# Patient Record
Sex: FEMALE | Race: WHITE | NOT HISPANIC OR LATINO | Employment: OTHER | URBAN - METROPOLITAN AREA
[De-identification: names, ages, dates, MRNs, and addresses within clinical notes are randomized per-mention and may not be internally consistent; named-entity substitution may affect disease eponyms.]

---

## 2023-05-23 LAB
ANION GAP IN SER/PLAS: 13 MMOL/L (ref 10–20)
CALCIUM (MG/DL) IN SER/PLAS: 9.5 MG/DL (ref 8.6–10.6)
CARBON DIOXIDE, TOTAL (MMOL/L) IN SER/PLAS: 26 MMOL/L (ref 21–32)
CHLORIDE (MMOL/L) IN SER/PLAS: 109 MMOL/L (ref 98–107)
CREATININE (MG/DL) IN SER/PLAS: 1.65 MG/DL (ref 0.5–1.05)
GFR FEMALE: 31 ML/MIN/1.73M2
GLUCOSE (MG/DL) IN SER/PLAS: 78 MG/DL (ref 74–99)
POTASSIUM (MMOL/L) IN SER/PLAS: 4.5 MMOL/L (ref 3.5–5.3)
SODIUM (MMOL/L) IN SER/PLAS: 143 MMOL/L (ref 136–145)
UREA NITROGEN (MG/DL) IN SER/PLAS: 36 MG/DL (ref 6–23)

## 2023-11-28 ENCOUNTER — OFFICE VISIT (OUTPATIENT)
Dept: OBSTETRICS AND GYNECOLOGY | Facility: CLINIC | Age: 83
End: 2023-11-28
Payer: MEDICARE

## 2023-11-28 VITALS
DIASTOLIC BLOOD PRESSURE: 65 MMHG | SYSTOLIC BLOOD PRESSURE: 110 MMHG | HEART RATE: 89 BPM | BODY MASS INDEX: 23.36 KG/M2 | WEIGHT: 129.8 LBS

## 2023-11-28 DIAGNOSIS — Z46.89 PESSARY MAINTENANCE: ICD-10-CM

## 2023-11-28 DIAGNOSIS — N81.4 UTEROVAGINAL PROLAPSE: Primary | ICD-10-CM

## 2023-11-28 PROCEDURE — 99213 OFFICE O/P EST LOW 20 MIN: CPT | Mod: PO | Performed by: NURSE PRACTITIONER

## 2023-11-28 PROCEDURE — 99213 OFFICE O/P EST LOW 20 MIN: CPT | Performed by: NURSE PRACTITIONER

## 2023-11-28 PROCEDURE — 1126F AMNT PAIN NOTED NONE PRSNT: CPT | Performed by: NURSE PRACTITIONER

## 2023-11-28 NOTE — PROGRESS NOTES
Procedures    Procedure Orders   Pessary [[id number]] ordered by SIS Montes          Pessary     Date/Time: 11/28/2023 9:53 AM     Performed by: SIS Montes  Authorized by: SIS Montes    Consent:     Consent given by:  Patient  Indication:     Indication for pessary: uterine prolapse    Procedure:     Pessary type:  Ring w/ support    Pessary size:  3         Additional Comments:  - Denies the use of Estrogen cream due to insurance not covering it   - Wears panty liners   - Normal healthy vaginal tissue   - Denies itching, burning, or overall complaints  Pessary removed, washed and replaced.     RTC in 3 months     SIS Montes

## 2024-01-22 ENCOUNTER — TELEPHONE (OUTPATIENT)
Dept: OBSTETRICS AND GYNECOLOGY | Facility: CLINIC | Age: 84
End: 2024-01-22

## 2024-01-22 PROBLEM — N18.30 CHRONIC KIDNEY DISEASE, STAGE 3 (MULTI): Status: ACTIVE | Noted: 2024-01-22

## 2024-01-22 PROBLEM — I25.10 CAD (CORONARY ARTERY DISEASE): Status: ACTIVE | Noted: 2024-01-22

## 2024-01-22 PROBLEM — I10 HYPERTENSION: Status: ACTIVE | Noted: 2024-01-22

## 2024-01-23 ENCOUNTER — APPOINTMENT (OUTPATIENT)
Dept: VASCULAR MEDICINE | Facility: HOSPITAL | Age: 84
End: 2024-01-23
Payer: MEDICARE

## 2024-01-23 ENCOUNTER — OFFICE VISIT (OUTPATIENT)
Dept: CARDIOLOGY | Facility: HOSPITAL | Age: 84
End: 2024-01-23
Payer: MEDICARE

## 2024-01-23 VITALS
HEIGHT: 63 IN | WEIGHT: 129 LBS | SYSTOLIC BLOOD PRESSURE: 126 MMHG | OXYGEN SATURATION: 98 % | BODY MASS INDEX: 22.86 KG/M2 | DIASTOLIC BLOOD PRESSURE: 52 MMHG | HEART RATE: 75 BPM

## 2024-01-23 DIAGNOSIS — E78.2 MIXED HYPERLIPIDEMIA: Primary | ICD-10-CM

## 2024-01-23 DIAGNOSIS — I25.10 CORONARY ARTERY DISEASE INVOLVING NATIVE CORONARY ARTERY OF NATIVE HEART WITHOUT ANGINA PECTORIS: ICD-10-CM

## 2024-01-23 DIAGNOSIS — I10 PRIMARY HYPERTENSION: ICD-10-CM

## 2024-01-23 PROBLEM — N81.4 CYSTOCELE WITH UTERINE PROLAPSE: Status: ACTIVE | Noted: 2024-01-23

## 2024-01-23 PROBLEM — S09.90XS DIZZINESS DUE TO OLD HEAD TRAUMA: Status: ACTIVE | Noted: 2024-01-23

## 2024-01-23 PROBLEM — R42 DIZZINESS DUE TO OLD HEAD TRAUMA: Status: ACTIVE | Noted: 2024-01-23

## 2024-01-23 PROBLEM — M25.512 LEFT SHOULDER PAIN: Status: ACTIVE | Noted: 2024-01-23

## 2024-01-23 PROBLEM — H26.493 BILATERAL POSTERIOR CAPSULAR OPACIFICATION: Status: ACTIVE | Noted: 2023-09-07

## 2024-01-23 PROBLEM — N95.2 ATROPHY OF VAGINA: Status: ACTIVE | Noted: 2024-01-23

## 2024-01-23 PROBLEM — K21.9 GERD (GASTROESOPHAGEAL REFLUX DISEASE): Status: ACTIVE | Noted: 2024-01-23

## 2024-01-23 PROBLEM — R42 VERTIGO: Status: ACTIVE | Noted: 2024-01-23

## 2024-01-23 PROBLEM — N28.9 ABNORMAL RENAL FUNCTION: Status: ACTIVE | Noted: 2024-01-23
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
## 2024-01-23 PROBLEM — H69.93 DYSFUNCTION OF BOTH EUSTACHIAN TUBES: Status: ACTIVE | Noted: 2024-01-23

## 2024-01-23 PROBLEM — K80.20 GALLSTONES: Status: ACTIVE | Noted: 2024-01-23

## 2024-01-23 PROBLEM — R31.9 HEMATURIA: Status: ACTIVE | Noted: 2024-01-23

## 2024-01-23 PROBLEM — H93.13 BILATERAL TINNITUS: Status: ACTIVE | Noted: 2024-01-23

## 2024-01-23 PROBLEM — N39.0 ACUTE LOWER UTI: Status: ACTIVE | Noted: 2024-01-23

## 2024-01-23 PROBLEM — S09.90XA HEAD TRAUMA: Status: ACTIVE | Noted: 2024-01-23

## 2024-01-23 PROBLEM — R63.5 ABNORMAL WEIGHT GAIN: Status: ACTIVE | Noted: 2024-01-23

## 2024-01-23 PROBLEM — R30.0 DYSURIA: Status: ACTIVE | Noted: 2024-01-23

## 2024-01-23 PROBLEM — Z96.1 PSEUDOPHAKIA OF BOTH EYES: Status: ACTIVE | Noted: 2023-08-16

## 2024-01-23 PROBLEM — H90.3 BILATERAL SENSORINEURAL HEARING LOSS: Status: ACTIVE | Noted: 2024-01-23

## 2024-01-23 PROBLEM — H35.3130 BILATERAL DRY AGE-RELATED MACULAR DEGENERATION: Status: ACTIVE | Noted: 2023-08-16

## 2024-01-23 PROBLEM — R09.89 DIMINISHED PULSES IN LOWER EXTREMITY: Status: ACTIVE | Noted: 2024-01-23

## 2024-01-23 PROBLEM — M25.511 RIGHT SHOULDER PAIN: Status: ACTIVE | Noted: 2024-01-23

## 2024-01-23 PROBLEM — H00.15 CHALAZION OF LEFT LOWER EYELID: Status: ACTIVE | Noted: 2023-09-07

## 2024-01-23 PROBLEM — H52.7 REFRACTION ERROR: Status: ACTIVE | Noted: 2023-09-07

## 2024-01-23 PROBLEM — M25.619 STIFFNESS OF SHOULDER JOINT: Status: ACTIVE | Noted: 2024-01-23

## 2024-01-23 PROCEDURE — 3078F DIAST BP <80 MM HG: CPT | Performed by: NURSE PRACTITIONER

## 2024-01-23 PROCEDURE — 1159F MED LIST DOCD IN RCRD: CPT | Performed by: NURSE PRACTITIONER

## 2024-01-23 PROCEDURE — 93005 ELECTROCARDIOGRAM TRACING: CPT | Performed by: NURSE PRACTITIONER

## 2024-01-23 PROCEDURE — 93010 ELECTROCARDIOGRAM REPORT: CPT | Performed by: INTERNAL MEDICINE

## 2024-01-23 PROCEDURE — 99214 OFFICE O/P EST MOD 30 MIN: CPT | Performed by: NURSE PRACTITIONER

## 2024-01-23 PROCEDURE — 1157F ADVNC CARE PLAN IN RCRD: CPT | Performed by: NURSE PRACTITIONER

## 2024-01-23 PROCEDURE — 1036F TOBACCO NON-USER: CPT | Performed by: NURSE PRACTITIONER

## 2024-01-23 PROCEDURE — 3074F SYST BP LT 130 MM HG: CPT | Performed by: NURSE PRACTITIONER

## 2024-01-23 PROCEDURE — 1126F AMNT PAIN NOTED NONE PRSNT: CPT | Performed by: NURSE PRACTITIONER

## 2024-01-23 RX ORDER — LISINOPRIL 20 MG/1
20 TABLET ORAL DAILY
COMMUNITY
End: 2024-03-19 | Stop reason: ALTCHOICE

## 2024-01-23 RX ORDER — ATORVASTATIN CALCIUM 80 MG/1
80 TABLET, FILM COATED ORAL NIGHTLY
COMMUNITY
End: 2024-04-10

## 2024-01-23 RX ORDER — MULTIVIT-MIN/FA/LYCOPEN/LUTEIN .4-300-25
1 TABLET ORAL DAILY
COMMUNITY
Start: 2022-04-05

## 2024-01-23 RX ORDER — POLYETHYLENE GLYCOL 3350 17 G/17G
17 POWDER, FOR SOLUTION ORAL AS NEEDED
COMMUNITY
Start: 2022-04-05

## 2024-01-23 RX ORDER — ASPIRIN 81 MG/1
1 TABLET ORAL DAILY
COMMUNITY
Start: 2014-10-15

## 2024-01-23 NOTE — PATIENT INSTRUCTIONS
Continue current cardiovascular medications  Follow up in 6 months with lower extremity vascular testing.

## 2024-01-23 NOTE — PROGRESS NOTES
Primary Care Physician: Maria Ines Travis DO  Date of Visit: 01/23/2024 10:00 AM EST  Location of visit: [facility]     Chief Complaint:   Chief Complaint   Patient presents with    Follow-up    Coronary Artery Disease    Hyperlipidemia    Hypertension        HPI / Summary:   Yin Harris is a 83 y.o. female presents for followup. Seen in collaboration with Dr. Mata. She has no particular complaints. She is able to do daily activity without chest pain or dyspnea. Her prior orthostatic lightheadedness resolved. The patient denies chest pain, shortness of breath, palpitations,  lightheadedness,syncope, orthopnea, paroxysmal nocturnal dyspnea, lower extremity edema, or bleeding problems.             Past Medical History:  Past Medical History:   Diagnosis Date    Atherosclerotic heart disease of native coronary artery without angina pectoris 07/12/2022    CAD (coronary artery disease)    Essential (primary) hypertension 07/12/2022    Hypertension    Hyperlipidemia, unspecified 07/12/2022    Hyperlipidemia    Personal history of other diseases of urinary system 04/05/2022    History of chronic kidney disease        Past Surgical History:  Past Surgical History:   Procedure Laterality Date    MR HEAD ANGIO WO IV CONTRAST  7/11/2016    MR HEAD ANGIO WO IV CONTRAST 7/11/2016 [facility] EMERGENCY LEGACY    MR NECK ANGIO WO IV CONTRAST  7/11/2016    MR NECK ANGIO WO IV CONTRAST 7/11/2016 [facility] EMERGENCY LEGACY    OTHER SURGICAL HISTORY  11/18/2014    Previous Stent Placement    OTHER SURGICAL HISTORY  03/04/2022    Cataract surgery    OTHER SURGICAL HISTORY  01/20/2022    Cholecystectomy    TONSILLECTOMY  03/04/2022    Tonsillectomy With Adenoidectomy          Social History:   reports that she quit smoking about 10 years ago. Her smoking use included cigarettes. She has never used smokeless tobacco.     Family History:  family history is not on file.      Allergies:  No Known Allergies    Outpatient Medications:  Current  "Outpatient Medications   Medication Instructions    aspirin 81 mg EC tablet 1 tablet, oral, Daily    atorvastatin (LIPITOR) 80 mg, oral, Nightly    lisinopril 20 mg, oral, Daily    multivitamin with minerals iron-free (Centrum Silver) 1 tablet, oral, Daily    polyethylene glycol (MIRALAX) 17 g, oral, As needed       Physical Exam:  Vitals:    01/23/24 0953   BP: 126/52   BP Location: Left arm   Patient Position: Sitting   BP Cuff Size: Adult   Pulse: 75   SpO2: 98%   Weight: 58.5 kg (129 lb)   Height: 1.588 m (5' 2.5\")     Wt Readings from Last 5 Encounters:   01/23/24 58.5 kg (129 lb)   11/28/23 58.9 kg (129 lb 12.8 oz)   08/29/23 60.3 kg (133 lb)   07/18/23 60.8 kg (134 lb)   05/09/23 60.3 kg (133 lb)     Body mass index is 23.22 kg/m².     GENERAL: alert, cooperative, pleasant, in no acute distress  SKIN: warm and dry  NECK: Normal JVD, negative HJR  CARDIAC: Regular rate and rhythm with no rubs, murmurs, or gallops  CHEST: Normal respiratory efforts, lungs clear to auscultation bilaterally.  ABDOMEN: soft, nontender, nondistended  EXTREMITIES: no edema, +2 palpable RP bilaterally and +1 right PT pulse and faint left DP pulse. Unable to palpate left PT pulse       Last Labs:  Recent Labs     01/19/23  1113 03/04/22  1103 01/05/22  1139   WBC 5.3 5.1 6.4   HGB 11.7* 11.0* 11.1*   HCT 38.2 36.3 35.5*    228 197   MCV 96 96 95     Recent Labs     05/23/23  0709 01/19/23  1113 07/12/22  1115    142 141   K 4.5 4.2 3.9   * 107 107   BUN 36* 35* 25*   CREATININE 1.65* 1.78* 1.49*     CMP -  Lab Results   Component Value Date    CALCIUM 9.5 05/23/2023    PROT 6.5 01/19/2023    ALBUMIN 3.8 01/19/2023    AST 26 01/19/2023    ALT 21 01/19/2023    ALKPHOS 70 01/19/2023    BILITOT 0.5 01/19/2023       LIPID PANEL -   Lab Results   Component Value Date    CHOL 130 01/19/2023    HDL 56.1 01/19/2023    LDLF 59 01/19/2023    TRIG 77 01/19/2023       No results found for: \"BNP\", \"HGBA1C\"    Last Cardiology " Tests:  ECG:  Obtained and reviewed EKG- normal sinus rhythm HR 66               Assessment/Plan   Yin was seen today for follow-up, coronary artery disease, hyperlipidemia and hypertension.  Diagnoses and all orders for this visit:  Mixed hyperlipidemia (Primary)  -     ECG 12 lead (Clinic Performed)  Coronary artery disease involving native coronary artery of native heart without angina pectoris  Primary hypertension      Ms. Harris is a pleasant 83 year old white female with a past medical history significant for coronary artery disease, status post posterior lateral ST elevation myocardial infarction and drug-eluting stent placement in the obtuse marginal branch with residual disease in her RCA and preserved left ventricular systolic function, hypertension, hyperlipidemia, chronic kidney disease, and prior chronic tobacco use. She is asymptomatic from a cardiac perspective. Her blood pressure is controlled. Recent lipid panel is at goal. She will have lower extremity vascular testing at next appointment as her left dorsal pedal pulse is weak. She had canceled prior appointment for testing. She should continue her current cardiovascular medications. She will follow up in 6 months.       Orders:  Orders Placed This Encounter   Procedures    ECG 12 lead (Clinic Performed)      Followup Appts:  Future Appointments   Date Time Provider Department Center   2/22/2024 10:15 AM SIS Montes [id number]   7/16/2024 10:30 AM [facility] 2 AHUVSC U [facility]   7/16/2024 11:30 AM SIS Helms [id number] [facility]   1/21/2025  9:00 AM Anil Mata MD AHUCR1 [facility]           ____________________________________________________________  SIS Helms  [facility]  [facility]

## 2024-02-18 LAB
ATRIAL RATE: 66 BPM
P AXIS: 58 DEGREES
P OFFSET: 194 MS
P ONSET: 146 MS
PR INTERVAL: 150 MS
Q ONSET: 221 MS
QRS COUNT: 11 BEATS
QRS DURATION: 84 MS
QT INTERVAL: 398 MS
QTC CALCULATION(BAZETT): 417 MS
QTC FREDERICIA: 411 MS
R AXIS: 80 DEGREES
T AXIS: 57 DEGREES
T OFFSET: 420 MS
VENTRICULAR RATE: 66 BPM

## 2024-02-22 ENCOUNTER — OFFICE VISIT (OUTPATIENT)
Dept: OBSTETRICS AND GYNECOLOGY | Facility: CLINIC | Age: 84
End: 2024-02-22
Payer: MEDICARE

## 2024-02-22 VITALS
HEIGHT: 63 IN | DIASTOLIC BLOOD PRESSURE: 52 MMHG | SYSTOLIC BLOOD PRESSURE: 89 MMHG | HEART RATE: 80 BPM | WEIGHT: 128 LBS | BODY MASS INDEX: 22.68 KG/M2

## 2024-02-22 DIAGNOSIS — N81.4 UTEROVAGINAL PROLAPSE: Primary | ICD-10-CM

## 2024-02-22 DIAGNOSIS — Z46.89 PESSARY MAINTENANCE: ICD-10-CM

## 2024-02-22 PROCEDURE — 1126F AMNT PAIN NOTED NONE PRSNT: CPT | Performed by: NURSE PRACTITIONER

## 2024-02-22 PROCEDURE — 3078F DIAST BP <80 MM HG: CPT | Performed by: NURSE PRACTITIONER

## 2024-02-22 PROCEDURE — 99213 OFFICE O/P EST LOW 20 MIN: CPT | Performed by: NURSE PRACTITIONER

## 2024-02-22 PROCEDURE — 3074F SYST BP LT 130 MM HG: CPT | Performed by: NURSE PRACTITIONER

## 2024-02-22 PROCEDURE — 1157F ADVNC CARE PLAN IN RCRD: CPT | Performed by: NURSE PRACTITIONER

## 2024-02-22 PROCEDURE — 1159F MED LIST DOCD IN RCRD: CPT | Performed by: NURSE PRACTITIONER

## 2024-02-22 PROCEDURE — 1036F TOBACCO NON-USER: CPT | Performed by: NURSE PRACTITIONER

## 2024-02-22 ASSESSMENT — ENCOUNTER SYMPTOMS
PSYCHIATRIC NEGATIVE: 1
NEUROLOGICAL NEGATIVE: 1
GASTROINTESTINAL NEGATIVE: 1
CONSTITUTIONAL NEGATIVE: 1
RESPIRATORY NEGATIVE: 1

## 2024-02-22 ASSESSMENT — PAIN SCALES - GENERAL: PAINLEVEL: 0-NO PAIN

## 2024-02-22 NOTE — PROGRESS NOTES
"Urogynecology Pessary Check Visit  Jaye Cao, APRN-CNP  [phone]      History of Present Illness:    SYDNEE Esqueda presents today with a history of low blood pressure and syncope, as well as a past incident of a broken shoulder. She reports that her typical blood pressure readings range from 110s to 120s systolic and 50s diastolic. Currently, Yin is under the medical management of Dr. Mcfarlane, with Dr. Travis serving as her primary care physician and Dr. Lynn overseeing her cardiac health. Her treatment regimen includes the administration of lisinopril for the management of her blood pressure.    Ms. Yin Harris  presents for pessary check .    1/22/2024   Pessary type: #3 ring with support   Bleeding?: denies   Discomfort?: denies   Bowel or bladder symptoms: none   Vaginal estrogen: yes, normal     Past medical, surgical, social, family, allergy, and medication histories were reviewed and updated in EPIC charting.       Review of Systems   Constitutional: Negative.    HENT: Negative.     Respiratory: Negative.     Gastrointestinal: Negative.    Genitourinary: Negative.    Neurological: Negative.    Psychiatric/Behavioral: Negative.           Objective    BP 89/52   Pulse 80   Ht 1.588 m (5' 2.5\")   Wt 58.1 kg (128 lb)   BMI 23.04 kg/m²    Body mass index is 23.04 kg/m².   Relevant medical history: History of low blood pressure, syncope, and a broken shoulder.  Current medications: Lisinopril.    Physical Exam:  Physical Exam  Constitutional:       Appearance: Normal appearance.   HENT:      Head: Normocephalic.   Eyes:      Extraocular Movements: Extraocular movements intact.      Conjunctiva/sclera: Conjunctivae normal.      Pupils: Pupils are equal, round, and reactive to light.   Cardiovascular:      Rate and Rhythm: Normal rate and regular rhythm.      Pulses: Normal pulses.      Heart sounds: Normal heart sounds.   Pulmonary:      Effort: Pulmonary effort is normal.      Breath sounds: " Normal breath sounds.   Abdominal:      General: Abdomen is flat. Bowel sounds are normal.      Palpations: Abdomen is soft.   Musculoskeletal:         General: Normal range of motion.      Cervical back: Normal range of motion.   Neurological:      General: No focal deficit present.      Mental Status: She is alert and oriented to person, place, and time.   Psychiatric:         Mood and Affect: Mood normal.         Behavior: Behavior normal.         Thought Content: Thought content normal.         Judgment: Judgment normal.             Pelvic exam:   Speculum examination: The vagina and cervix were inspected and were free  of erosions. No blood in vaginal vault. Normal discharge appreciated.    Bimanual exam: The uterus was unremarkable.  There were no masses or tenderness in the pelvis/adnexal region.   The pessary was removed, cleaned and replaced without complication     Assessment & Plan:    83 y.o. yo woman with uterine prolapse, managed with a pessary.    No problem-specific Assessment & Plan notes found for this encounter.  Comorbidities include: CAD, Mixed HLD, HTN, Stage III CKD, GERD    Plan:    1. Management of Hypotension     - Patient presents with a history of low blood pressure, currently documented at 89/15 mmHg. Usual systolic readings range from 110s-120s, with diastolic readings in the 50s.     - Plan of care includes:       a. Ongoing monitoring of blood pressure.       b. Continuation of lisinopril as directed by Dr. Travis.       c. Evaluation and management consultation with cardiologist Dr. Lynn.     2. Prolapse and Pessary management  -Satisfactory management with pessary with no erosions . Continue current care.    - no use of estrogen cream    - Return to clinic in 3 months        SIS Montes   By signing my name below, Samia DAVIS scribe attest that this documentation has been prepared under the direction and in the presence of SIS Montes,  Jaye Cao, FAWAD-MIRIAM, personally performed the services described in this documentation which was scribed virtually and I confirm that it is both accurate and complete.

## 2024-02-27 ENCOUNTER — APPOINTMENT (OUTPATIENT)
Dept: OBSTETRICS AND GYNECOLOGY | Facility: CLINIC | Age: 84
End: 2024-02-27
Payer: MEDICARE

## 2024-03-07 ENCOUNTER — TELEPHONE (OUTPATIENT)
Dept: CARDIOLOGY | Facility: HOSPITAL | Age: 84
End: 2024-03-07
Payer: MEDICARE

## 2024-03-07 NOTE — TELEPHONE ENCOUNTER
Patient reports syncopal event with LOC when getting out of bed last night. She denies injury. She reports her Bps have been in the 70-80s systaltically and she has been feeling severely dizzy with position changes.    She does not have family or transportation today to take her to urgent care or ED.  EMS advised, multiple times. She refused. Education provided on the risks of hypotension. She verbalized understanding but refused, stated she would drive to urgent care sometime. Advised her against driving.     Medication list reconciled. Instructed her to hold lisinopril until instructed further by Dr. Mata or RITCHIE Lynn. Encouraged hydration and slow position changes.

## 2024-03-14 ENCOUNTER — OFFICE VISIT (OUTPATIENT)
Dept: CARDIOLOGY | Facility: HOSPITAL | Age: 84
End: 2024-03-14
Payer: MEDICARE

## 2024-03-14 ENCOUNTER — LAB (OUTPATIENT)
Dept: LAB | Facility: LAB | Age: 84
End: 2024-03-14
Payer: MEDICARE

## 2024-03-14 VITALS
DIASTOLIC BLOOD PRESSURE: 63 MMHG | WEIGHT: 124 LBS | OXYGEN SATURATION: 95 % | SYSTOLIC BLOOD PRESSURE: 98 MMHG | BODY MASS INDEX: 22.82 KG/M2 | HEART RATE: 68 BPM | HEIGHT: 62 IN

## 2024-03-14 DIAGNOSIS — I10 PRIMARY HYPERTENSION: ICD-10-CM

## 2024-03-14 DIAGNOSIS — E78.2 MIXED HYPERLIPIDEMIA: ICD-10-CM

## 2024-03-14 DIAGNOSIS — R55 SYNCOPE AND COLLAPSE: Primary | ICD-10-CM

## 2024-03-14 DIAGNOSIS — R55 SYNCOPE AND COLLAPSE: ICD-10-CM

## 2024-03-14 LAB
ALBUMIN SERPL BCP-MCNC: 4 G/DL (ref 3.4–5)
ALP SERPL-CCNC: 80 U/L (ref 33–136)
ALT SERPL W P-5'-P-CCNC: 13 U/L (ref 7–45)
ANION GAP SERPL CALC-SCNC: 14 MMOL/L (ref 10–20)
AST SERPL W P-5'-P-CCNC: 19 U/L (ref 9–39)
ATRIAL RATE: 70 BPM
BILIRUB SERPL-MCNC: 0.6 MG/DL (ref 0–1.2)
BUN SERPL-MCNC: 43 MG/DL (ref 6–23)
CALCIUM SERPL-MCNC: 9.5 MG/DL (ref 8.6–10.3)
CHLORIDE SERPL-SCNC: 109 MMOL/L (ref 98–107)
CHOLEST SERPL-MCNC: 122 MG/DL (ref 0–199)
CHOLESTEROL/HDL RATIO: 2.7
CO2 SERPL-SCNC: 22 MMOL/L (ref 21–32)
CREAT SERPL-MCNC: 1.85 MG/DL (ref 0.5–1.05)
EGFRCR SERPLBLD CKD-EPI 2021: 27 ML/MIN/1.73M*2
ERYTHROCYTE [DISTWIDTH] IN BLOOD BY AUTOMATED COUNT: 13.1 % (ref 11.5–14.5)
GLUCOSE SERPL-MCNC: 96 MG/DL (ref 74–99)
HCT VFR BLD AUTO: 37.4 % (ref 36–46)
HDLC SERPL-MCNC: 45.9 MG/DL
HGB BLD-MCNC: 11.5 G/DL (ref 12–16)
LDLC SERPL CALC-MCNC: 48 MG/DL
MCH RBC QN AUTO: 29.9 PG (ref 26–34)
MCHC RBC AUTO-ENTMCNC: 30.7 G/DL (ref 32–36)
MCV RBC AUTO: 97 FL (ref 80–100)
NON HDL CHOLESTEROL: 76 MG/DL (ref 0–149)
NRBC BLD-RTO: 0 /100 WBCS (ref 0–0)
P AXIS: 75 DEGREES
P OFFSET: 208 MS
P ONSET: 159 MS
PLATELET # BLD AUTO: 206 X10*3/UL (ref 150–450)
POTASSIUM SERPL-SCNC: 4.6 MMOL/L (ref 3.5–5.3)
PR INTERVAL: 130 MS
PROT SERPL-MCNC: 6.7 G/DL (ref 6.4–8.2)
Q ONSET: 224 MS
QRS COUNT: 12 BEATS
QRS DURATION: 88 MS
QT INTERVAL: 386 MS
QTC CALCULATION(BAZETT): 416 MS
QTC FREDERICIA: 406 MS
R AXIS: 85 DEGREES
RBC # BLD AUTO: 3.84 X10*6/UL (ref 4–5.2)
SODIUM SERPL-SCNC: 140 MMOL/L (ref 136–145)
T AXIS: 69 DEGREES
T OFFSET: 417 MS
TRIGL SERPL-MCNC: 143 MG/DL (ref 0–149)
TSH SERPL-ACNC: 1.61 MIU/L (ref 0.44–3.98)
VENTRICULAR RATE: 70 BPM
VLDL: 29 MG/DL (ref 0–40)
WBC # BLD AUTO: 6.6 X10*3/UL (ref 4.4–11.3)

## 2024-03-14 PROCEDURE — 36415 COLL VENOUS BLD VENIPUNCTURE: CPT

## 2024-03-14 PROCEDURE — 93005 ELECTROCARDIOGRAM TRACING: CPT | Performed by: NURSE PRACTITIONER

## 2024-03-14 PROCEDURE — 85027 COMPLETE CBC AUTOMATED: CPT

## 2024-03-14 PROCEDURE — 99214 OFFICE O/P EST MOD 30 MIN: CPT | Performed by: NURSE PRACTITIONER

## 2024-03-14 PROCEDURE — 80053 COMPREHEN METABOLIC PANEL: CPT

## 2024-03-14 PROCEDURE — 3078F DIAST BP <80 MM HG: CPT | Performed by: NURSE PRACTITIONER

## 2024-03-14 PROCEDURE — 3074F SYST BP LT 130 MM HG: CPT | Performed by: NURSE PRACTITIONER

## 2024-03-14 PROCEDURE — 80061 LIPID PANEL: CPT

## 2024-03-14 PROCEDURE — 84443 ASSAY THYROID STIM HORMONE: CPT

## 2024-03-14 PROCEDURE — 1157F ADVNC CARE PLAN IN RCRD: CPT | Performed by: NURSE PRACTITIONER

## 2024-03-14 PROCEDURE — 1036F TOBACCO NON-USER: CPT | Performed by: NURSE PRACTITIONER

## 2024-03-14 PROCEDURE — 1159F MED LIST DOCD IN RCRD: CPT | Performed by: NURSE PRACTITIONER

## 2024-03-14 ASSESSMENT — ENCOUNTER SYMPTOMS
OCCASIONAL FEELINGS OF UNSTEADINESS: 1
LOSS OF SENSATION IN FEET: 0
DEPRESSION: 0

## 2024-03-14 NOTE — PATIENT INSTRUCTIONS
Increase water intake  Wear compression stockings  Sit on edge of bed for 5 minutes prior to standing up as your blood pressure is dropping upon standing up  Check blood work CBC, CMP, lipid panel, TSH  Event monitor  Do not take Lisinopril   Referral to neurologist for orthostatic hypotension

## 2024-03-14 NOTE — PROGRESS NOTES
"Primary Care Physician: Maria Ines Travis DO  Date of Visit: 03/14/2024  9:40 AM EDT  Location of visit: [facility]     Chief Complaint:   Chief Complaint   Patient presents with    Syncope    Hypotension    Dizziness        HPI / Summary:   Yin Harris is a 83 y.o. female presents for followup. Seen in collaboration with Dr. Mata. She reports an episode of syncope two weeks ago. She had stood up out of bed to go to bathroom. She felt like her knees were like \"jelly\" She recalls looking around and lowered her self to floor. She reports telling her son afterwards that she thought she passed out. This was not witnessed. She reports very brief loss of consciousness. Denies any injury. She does not recall if she was dizzy prior to episode. No loss of bowel or bladed. She does at times get dizzy/lightheaded upon standing up. She is going to stop driving. She does wear compression stockings. She denies any chest pain, shortness of breath, palpitations, orthopnea, paroxysmal nocturnal dyspnea, lower extremity edema, or bleeding problems.             Past Medical History:  Past Medical History:   Diagnosis Date    Atherosclerotic heart disease of native coronary artery without angina pectoris 07/12/2022    CAD (coronary artery disease)    Essential (primary) hypertension 07/12/2022    Hypertension    Hyperlipidemia, unspecified 07/12/2022    Hyperlipidemia    Personal history of other diseases of urinary system 04/05/2022    History of chronic kidney disease        Past Surgical History:  Past Surgical History:   Procedure Laterality Date    MR HEAD ANGIO WO IV CONTRAST  7/11/2016    MR HEAD ANGIO WO IV CONTRAST 7/11/2016 [facility] EMERGENCY LEGACY    MR NECK ANGIO WO IV CONTRAST  7/11/2016    MR NECK ANGIO WO IV CONTRAST 7/11/2016 [facility] EMERGENCY LEGACY    OTHER SURGICAL HISTORY  11/18/2014    Previous Stent Placement    OTHER SURGICAL HISTORY  03/04/2022    Cataract surgery    OTHER SURGICAL HISTORY  01/20/2022    " "Cholecystectomy    TONSILLECTOMY  03/04/2022    Tonsillectomy With Adenoidectomy          Social History:   reports that she quit smoking about 10 years ago. Her smoking use included cigarettes. She has never used smokeless tobacco.     Family History:  family history is not on file.      Allergies:  No Known Allergies    Outpatient Medications:  Current Outpatient Medications   Medication Instructions    aspirin 81 mg EC tablet 1 tablet, oral, Daily    atorvastatin (LIPITOR) 80 mg, oral, Nightly    lisinopril 20 mg, oral, Daily    multivitamin with minerals iron-free (Centrum Silver) 1 tablet, oral, Daily    polyethylene glycol (MIRALAX) 17 g, oral, As needed       Physical Exam:  Vitals:    03/14/24 0928 03/14/24 1007 03/14/24 1013   BP: 153/73 148/72 98/63   BP Location: Left arm     Pulse: 70 84 68   SpO2: 95%     Weight: 56.2 kg (124 lb)     Height: 1.575 m (5' 2\")       Wt Readings from Last 5 Encounters:   03/14/24 56.2 kg (124 lb)   02/22/24 58.1 kg (128 lb)   01/23/24 58.5 kg (129 lb)   11/28/23 58.9 kg (129 lb 12.8 oz)   08/29/23 60.3 kg (133 lb)     Body mass index is 22.68 kg/m².     GENERAL: alert, cooperative, pleasant, in no acute distress  SKIN: warm and dry  NECK: Normal JVD, negative HJR  CARDIAC: Regular rate and rhythm with no rubs, murmurs, or gallops  CHEST: Normal respiratory efforts, lungs clear to auscultation bilaterally.  ABDOMEN: soft, nontender, nondistended  EXTREMITIES: no edema, +2 palpable RP bilaterally      Last Labs:  Recent Labs     03/14/24  1110 01/19/23  1113 03/04/22  1103   WBC 6.6 5.3 5.1   HGB 11.5* 11.7* 11.0*   HCT 37.4 38.2 36.3    195 228   MCV 97 96 96     Recent Labs     03/14/24  1110 05/23/23  0709 01/19/23  1113    143 142   K 4.6 4.5 4.2   * 109* 107   BUN 43* 36* 35*   CREATININE 1.85* 1.65* 1.78*     CMP -  Lab Results   Component Value Date    CALCIUM 9.5 03/14/2024    PROT 6.7 03/14/2024    ALBUMIN 4.0 03/14/2024    AST 19 03/14/2024    ALT " "13 03/14/2024    ALKPHOS 80 03/14/2024    BILITOT 0.6 03/14/2024       LIPID PANEL -   Lab Results   Component Value Date    CHOL 122 03/14/2024    HDL 45.9 03/14/2024    LDLF 59 01/19/2023    TRIG 143 03/14/2024       No results found for: \"BNP\", \"HGBA1C\"    Last Cardiology Tests:  ECG:  Obtained and reviewed EKG- normal sinus rhythm HR 70               Assessment/Plan   Yin was seen today for syncope, hypotension and dizziness.  Diagnoses and all orders for this visit:  Syncope and collapse (Primary)  -     ECG 12 lead (Clinic Performed)  -     CBC; Future  -     Comprehensive metabolic panel; Future  -     TSH with reflex to Free T4 if abnormal; Future  -     Lipid panel; Future  -     Transthoracic echo (TTE) complete; Future  -     perflutren lipid microspheres (Definity) injection 0.5-10 mL of dilution  -     Referral to Neurology; Future  Primary hypertension  -     CBC; Future  -     Comprehensive metabolic panel; Future  -     TSH with reflex to Free T4 if abnormal; Future  Mixed hyperlipidemia  -     Lipid panel; Future      Ms. Harris is a pleasant 83 year old white female with a past medical history significant for coronary artery disease, status post posterior lateral ST elevation myocardial infarction and drug-eluting stent placement in the obtuse marginal branch with residual disease in her RCA and preserved left ventricular systolic function, hypertension, hyperlipidemia, chronic kidney disease, and prior chronic tobacco use. She had an episode of syncope with brief loss of consciousness upon standing up to go to the bathroom. This was not witnessed. She does seem to be having some degree of memory issues. She does not recall if she was dizzy prior to episode. Her orthostatic blood pressures were positive today. I have encouraged her to increase hydration and wear compression stockings. I have encouraged her to sit on the edge of the bed for 5 minutes prior to standing. I have instructed her to " remain off Lisinopril. I  have ordered an event monitor surveillance of arrhythmia. I have ordered an echocardiogram for surveillance of LV function. I have placed referral to neurologist for further evaluation as I suspect syncope was orthostatic in nature. I have ordered blood work as above. She will continue all other cardiovascular medications. She will follow up in May. I did offer to call her son to update him on plan of care. She stated she did not want me to call him and she will only tell him what she would like him to know.     Orders:  Orders Placed This Encounter   Procedures    CBC    Comprehensive metabolic panel    TSH with reflex to Free T4 if abnormal    Lipid panel    Referral to Neurology    ECG 12 lead (Clinic Performed)    Transthoracic echo (TTE) complete      Followup Appts:  Future Appointments   Date Time Provider Department Center   3/28/2024  9:00 AM [facility] ECHO 3 [id number] [facility]   5/16/2024  9:40 AM SIS Helms AHUCR1 [facility]   6/4/2024  9:15 AM SIS Montes [id number]   7/16/2024 10:30 AM [facility] VASC 2 AHUVSC [facility]   7/16/2024 11:30 AM SIS Helms TIMR1 [facility]   1/21/2025  9:00 AM [facility]           ____________________________________________________________  SIS Helms  [facility]  [facility]

## 2024-03-19 ENCOUNTER — TELEPHONE (OUTPATIENT)
Dept: CARDIOLOGY | Facility: HOSPITAL | Age: 84
End: 2024-03-19
Payer: MEDICARE

## 2024-03-19 NOTE — RESULT ENCOUNTER NOTE
Anemia stable. Renal function slightly worse. Increase hydration and follow up with pcp. TSH normal. Can you call her to make sure she is not taking her lisinopril. I forgot to take it off her med list, but I wrote it on her summary.

## 2024-03-19 NOTE — TELEPHONE ENCOUNTER
Unable to leave a message.     Per Allyson she should follow up with PCP and repeat BMP in three months.

## 2024-03-19 NOTE — TELEPHONE ENCOUNTER
----- Message from FAWAD Helms-CNP sent at 3/19/2024  9:51 AM EDT -----  Anemia stable. Renal function slightly worse. Increase hydration and follow up with pcp. TSH normal. Can you call her to make sure she is not taking her lisinopril. I forgot to take it off her med list, but I wrote it on her summary.

## 2024-03-25 ENCOUNTER — TELEPHONE (OUTPATIENT)
Dept: CARDIOLOGY | Facility: HOSPITAL | Age: 84
End: 2024-03-25
Payer: MEDICARE

## 2024-04-03 ENCOUNTER — TELEPHONE (OUTPATIENT)
Dept: CARDIOLOGY | Facility: HOSPITAL | Age: 84
End: 2024-04-03
Payer: MEDICARE

## 2024-04-03 NOTE — TELEPHONE ENCOUNTER
Spoke to patient. She is refusing the holter monitor. She was referred to neurology but is refusing to schedule with them as well. She is agreeable to getting an echocardiogram and is scheduled on 4/18/2024.

## 2024-04-03 NOTE — TELEPHONE ENCOUNTER
Result Communication    Resulted Orders   CBC   Result Value Ref Range    WBC 6.6 4.4 - 11.3 x10*3/uL    nRBC 0.0 0.0 - 0.0 /100 WBCs    RBC 3.84 (L) 4.00 - 5.20 x10*6/uL    Hemoglobin 11.5 (L) 12.0 - 16.0 g/dL    Hematocrit 37.4 36.0 - 46.0 %    MCV 97 80 - 100 fL    MCH 29.9 26.0 - 34.0 pg    MCHC 30.7 (L) 32.0 - 36.0 g/dL    RDW 13.1 11.5 - 14.5 %    Platelets 206 150 - 450 x10*3/uL   Comprehensive metabolic panel   Result Value Ref Range    Glucose 96 74 - 99 mg/dL    Sodium 140 136 - 145 mmol/L    Potassium 4.6 3.5 - 5.3 mmol/L    Chloride 109 (H) 98 - 107 mmol/L    Bicarbonate 22 21 - 32 mmol/L    Anion Gap 14 10 - 20 mmol/L    Urea Nitrogen 43 (H) 6 - 23 mg/dL    Creatinine 1.85 (H) 0.50 - 1.05 mg/dL    eGFR 27 (L) >60 mL/min/1.73m*2      Comment:      Calculations of estimated GFR are performed using the 2021 CKD-EPI Study Refit equation without the race variable for the IDMS-Traceable creatinine methods.  https://jasn.asnjournals.org/content/early/2021/09/22/ASN.8318926476    Calcium 9.5 8.6 - 10.3 mg/dL    Albumin 4.0 3.4 - 5.0 g/dL    Alkaline Phosphatase 80 33 - 136 U/L    Total Protein 6.7 6.4 - 8.2 g/dL    AST 19 9 - 39 U/L    Bilirubin, Total 0.6 0.0 - 1.2 mg/dL    ALT 13 7 - 45 U/L      Comment:      Patients treated with Sulfasalazine may generate falsely decreased results for ALT.   TSH with reflex to Free T4 if abnormal   Result Value Ref Range    Thyroid Stimulating Hormone 1.61 0.44 - 3.98 mIU/L    Narrative    TSH testing is performed using different testing methodology at [facility] than at other [facility] hospitals. Direct result comparisons should only be made within the same method.     Lipid panel   Result Value Ref Range    Cholesterol 122 0 - 199 mg/dL      Comment:            Age      Desirable   Borderline High   High     0-19 Y     0 - 169       170 - 199     >/= 200    20-24 Y     0 - 189       190 - 224     >/= 225         >24 Y     0 - 199       200 - 239     >/= 240    **All ranges are based on fasting samples. Specific   therapeutic targets will vary based on patient-specific   cardiac risk.    Pediatric guidelines reference:Pediatrics 2011, 128(S5).Adult guidelines reference: NCEP ATPIII Guidelines,JB 2001, 258:2486-97    Venipuncture immediately after or during the administration of Metamizole may lead to falsely low results. Testing should be performed immediately prior to Metamizole dosing.    HDL-Cholesterol 45.9 mg/dL      Comment:        Age       Very Low   Low     Normal    High    0-19 Y    < 35      < 40     40-45     ----  20-24 Y    ----     < 40      >45      ----        >24 Y      ----     < 40     40-60      >60      Cholesterol/HDL Ratio 2.7       Comment:        Ref Values  Desirable  < 3.4  High Risk  > 5.0    LDL Calculated 48 <=99 mg/dL      Comment:                                  Near   Borderline      AGE      Desirable  Optimal    High     High     Very High     0-19 Y     0 - 109     ---    110-129   >/= 130     ----    20-24 Y     0 - 119     ---    120-159   >/= 160     ----      >24 Y     0 -  99   100-129  130-159   160-189     >/=190      VLDL 29 0 - 40 mg/dL    Triglycerides 143 0 - 149 mg/dL      Comment:         Age         Desirable   Borderline High   High     Very High   0 D-90 D    19 - 174         ----         ----        ----  91 D- 9 Y     0 -  74        75 -  99     >/= 100      ----    10-19 Y     0 -  89        90 - 129     >/= 130      ----    20-24 Y     0 - 114       115 - 149     >/= 150      ----         >24 Y     0 - 149       150 - 199    200- 499    >/= 500    Venipuncture immediately after or during the administration of Metamizole may lead to falsely low results. Testing should be performed immediately prior to Metamizole dosing.    Non HDL Cholesterol 76 0 - 149 mg/dL      Comment:            Age       Desirable   Borderline High   High     Very High     0-19 Y     0 - 119       120 - 144     >/= 145    >/= 160    20-24 Y      0 - 149       150 - 189     >/= 190      ----         >24 Y    30 mg/dL above LDL Cholesterol goal         4:18 PM      Results were successfully communicated with the patient and they acknowledged their understanding. She is not taking lisinopril.

## 2024-04-09 DIAGNOSIS — I25.10 CORONARY ARTERY DISEASE INVOLVING NATIVE CORONARY ARTERY OF NATIVE HEART WITHOUT ANGINA PECTORIS: ICD-10-CM

## 2024-04-09 DIAGNOSIS — E78.2 MIXED HYPERLIPIDEMIA: Primary | ICD-10-CM

## 2024-04-10 DIAGNOSIS — I10 PRIMARY HYPERTENSION: Primary | ICD-10-CM

## 2024-04-10 RX ORDER — ATORVASTATIN CALCIUM 80 MG/1
80 TABLET, FILM COATED ORAL NIGHTLY
Qty: 90 TABLET | Refills: 3 | Status: SHIPPED | OUTPATIENT
Start: 2024-04-10

## 2024-04-10 RX ORDER — LISINOPRIL 20 MG/1
20 TABLET ORAL DAILY
Qty: 90 TABLET | Refills: 3 | Status: SHIPPED | OUTPATIENT
Start: 2024-04-10 | End: 2024-05-16 | Stop reason: ALTCHOICE

## 2024-04-18 ENCOUNTER — HOSPITAL ENCOUNTER (OUTPATIENT)
Dept: CARDIOLOGY | Facility: HOSPITAL | Age: 84
Discharge: HOME | End: 2024-04-18
Payer: MEDICARE

## 2024-04-18 DIAGNOSIS — R55 SYNCOPE AND COLLAPSE: ICD-10-CM

## 2024-04-18 LAB
AORTIC VALVE PEAK VELOCITY: 1.16 M/S
AV PEAK GRADIENT: 5.4 MMHG
AVA (PEAK VEL): 2.07 CM2
EJECTION FRACTION APICAL 4 CHAMBER: 54
LEFT ATRIUM VOLUME AREA LENGTH INDEX BSA: 27.5 ML/M2
LEFT VENTRICLE INTERNAL DIMENSION DIASTOLE: 3.92 CM (ref 3.5–6)
LEFT VENTRICULAR OUTFLOW TRACT DIAMETER: 1.7 CM
LV EJECTION FRACTION BIPLANE: 56 %
MITRAL VALVE E/A RATIO: 1.74
RIGHT VENTRICLE FREE WALL PEAK S': 10.8 CM/S
RIGHT VENTRICLE PEAK SYSTOLIC PRESSURE: 48.4 MMHG
TRICUSPID ANNULAR PLANE SYSTOLIC EXCURSION: 2.1 CM

## 2024-04-18 PROCEDURE — 93306 TTE W/DOPPLER COMPLETE: CPT

## 2024-04-18 PROCEDURE — 93306 TTE W/DOPPLER COMPLETE: CPT | Performed by: INTERNAL MEDICINE

## 2024-04-24 ENCOUNTER — TELEPHONE (OUTPATIENT)
Dept: CARDIOLOGY | Facility: HOSPITAL | Age: 84
End: 2024-04-24
Payer: MEDICARE

## 2024-04-24 NOTE — TELEPHONE ENCOUNTER
----- Message from FAWAD Helms-CNP sent at 4/24/2024 10:02 AM EDT -----  Normal LV function, Moderate MR, Mild to moderate TR.

## 2024-05-16 ENCOUNTER — OFFICE VISIT (OUTPATIENT)
Dept: CARDIOLOGY | Facility: HOSPITAL | Age: 84
End: 2024-05-16
Payer: MEDICARE

## 2024-05-16 VITALS
OXYGEN SATURATION: 97 % | DIASTOLIC BLOOD PRESSURE: 57 MMHG | BODY MASS INDEX: 23.37 KG/M2 | WEIGHT: 127 LBS | HEIGHT: 62 IN | HEART RATE: 76 BPM | SYSTOLIC BLOOD PRESSURE: 143 MMHG

## 2024-05-16 DIAGNOSIS — I25.10 CORONARY ARTERY DISEASE INVOLVING NATIVE CORONARY ARTERY OF NATIVE HEART WITHOUT ANGINA PECTORIS: Primary | ICD-10-CM

## 2024-05-16 DIAGNOSIS — E78.2 MIXED HYPERLIPIDEMIA: ICD-10-CM

## 2024-05-16 PROCEDURE — 99214 OFFICE O/P EST MOD 30 MIN: CPT | Performed by: NURSE PRACTITIONER

## 2024-05-16 PROCEDURE — 1159F MED LIST DOCD IN RCRD: CPT | Performed by: NURSE PRACTITIONER

## 2024-05-16 PROCEDURE — 1157F ADVNC CARE PLAN IN RCRD: CPT | Performed by: NURSE PRACTITIONER

## 2024-05-16 PROCEDURE — 3077F SYST BP >= 140 MM HG: CPT | Performed by: NURSE PRACTITIONER

## 2024-05-16 PROCEDURE — 3078F DIAST BP <80 MM HG: CPT | Performed by: NURSE PRACTITIONER

## 2024-05-16 ASSESSMENT — ENCOUNTER SYMPTOMS
OCCASIONAL FEELINGS OF UNSTEADINESS: 0
LOSS OF SENSATION IN FEET: 0
DEPRESSION: 0

## 2024-05-16 NOTE — PROGRESS NOTES
Primary Care Physician: Maria Ines Travis DO  Date of Visit: 05/16/2024  9:40 AM EDT  Location of visit: [facility]     Chief Complaint:   Chief Complaint   Patient presents with    Follow-up        HPI / Summary:   Yin Harris is a 84 y.o. female presents for followup. Seen in collaboration with Dr. Mata. She has no particular complaints. She has been able to walk up and down the stairs at home without chest pain or dyspnea. She has occasional lower extremity edema. Her orthostatic lightheadedness has improved since stopping Lisinopril. No further syncopal episodes. She does seem have some degree of memory impairment. She reports systolic blood pressures at home are in he 120s. She denies any chest pain, shortness of breath, palpitations, orthopnea, paroxysmal nocturnal dyspnea, lower extremity edema, or bleeding problems.           Past Medical History:  Past Medical History:   Diagnosis Date    Atherosclerotic heart disease of native coronary artery without angina pectoris 07/12/2022    CAD (coronary artery disease)    Essential (primary) hypertension 07/12/2022    Hypertension    Hyperlipidemia, unspecified 07/12/2022    Hyperlipidemia    Personal history of other diseases of urinary system 04/05/2022    History of chronic kidney disease        Past Surgical History:  Past Surgical History:   Procedure Laterality Date    MR HEAD ANGIO WO IV CONTRAST  7/11/2016    MR HEAD ANGIO WO IV CONTRAST 7/11/2016 [facility]    MR NECK ANGIO WO IV CONTRAST  7/11/2016    MR NECK ANGIO WO IV CONTRAST 7/11/2016 [facility]    OTHER SURGICAL HISTORY  11/18/2014    Previous Stent Placement    OTHER SURGICAL HISTORY  03/04/2022    Cataract surgery    OTHER SURGICAL HISTORY  01/20/2022    Cholecystectomy    TONSILLECTOMY  03/04/2022    Tonsillectomy With Adenoidectomy          Social History:   reports that she quit smoking about 10 years ago. Her smoking use included cigarettes. She has never used  "smokeless tobacco.     Family History:  family history is not on file.      Allergies:  No Known Allergies    Outpatient Medications:  Current Outpatient Medications   Medication Instructions    aspirin 81 mg EC tablet 1 tablet, oral, Daily    atorvastatin (LIPITOR) 80 mg, oral, Nightly    multivitamin with minerals iron-free (Centrum Silver) 1 tablet, oral, Daily    polyethylene glycol (MIRALAX) 17 g, oral, As needed       Physical Exam:  Vitals:    05/16/24 0922   BP: 143/57   BP Location: Left arm   Patient Position: Sitting   BP Cuff Size: Adult   Pulse: 76   SpO2: 97%   Weight: 57.6 kg (127 lb)   Height: 1.575 m (5' 2\")     Wt Readings from Last 5 Encounters:   05/16/24 57.6 kg (127 lb)   03/14/24 56.2 kg (124 lb)   02/22/24 58.1 kg (128 lb)   01/23/24 58.5 kg (129 lb)   11/28/23 58.9 kg (129 lb 12.8 oz)     Body mass index is 23.23 kg/m².     GENERAL: alert, cooperative, pleasant, in no acute distress  SKIN: warm and dry  NECK: Normal JVD, negative HJR  CARDIAC: Regular rate and rhythm with no rubs, murmurs, or gallops  CHEST: Normal respiratory efforts, lungs clear to auscultation bilaterally.  ABDOMEN: soft, nontender, nondistended  EXTREMITIES: no edema, +2 palpable RP bilaterally and +1 DP pulses bilaterally     Last Labs:  Recent Labs     03/14/24  1110 01/19/23  1113 03/04/22  1103   WBC 6.6 5.3 5.1   HGB 11.5* 11.7* 11.0*   HCT 37.4 38.2 36.3    195 228   MCV 97 96 96     Recent Labs     03/14/24  1110 05/23/23  0709 01/19/23  1113    143 142   K 4.6 4.5 4.2   * 109* 107   BUN 43* 36* 35*   CREATININE 1.85* 1.65* 1.78*     CMP -  Lab Results   Component Value Date    CALCIUM 9.5 03/14/2024    PROT 6.7 03/14/2024    ALBUMIN 4.0 03/14/2024    AST 19 03/14/2024    ALT 13 03/14/2024    ALKPHOS 80 03/14/2024    BILITOT 0.6 03/14/2024       LIPID PANEL -   Lab Results   Component Value Date    CHOL 122 03/14/2024    HDL 45.9 03/14/2024    LDLF 59 01/19/2023    TRIG 143 03/14/2024       No " "results found for: \"BNP\", \"HGBA1C\"    Last Cardiology Tests:  ECG:  Reviewed EKG from 3/14/24- normal sinus rhythm HR 70     4/18/24 ECHO:   CONCLUSIONS:   1. Left ventricular systolic function is normal with a 60-65% estimated ejection fraction.   2. Spectral Doppler shows a pseudonormal pattern of left ventricular diastolic filling.   3. Moderate mitral valve regurgitation.   4. Mild to moderate tricuspid regurgitation visualized.   5. Mildly elevated RVSP.   6. There is moderate aortic valve cusp calcification.        Assessment/Plan   Yin was seen today for follow-up.  Diagnoses and all orders for this visit:  Coronary artery disease involving native coronary artery of native heart without angina pectoris (Primary)  Mixed hyperlipidemia      Ms. Harris is a pleasant 84 year old white female with a past medical history significant for coronary artery disease, status post posterior lateral ST elevation myocardial infarction and drug-eluting stent placement in the obtuse marginal branch with residual disease in her RCA and preserved left ventricular systolic function, hypertension, hyperlipidemia, chronic kidney disease, and prior chronic tobacco use. No further episodes of syncope. Her blood pressure today is acceptable. She declined a heart monitor as previously ordered. I suspect her prior syncopal episode was orthostatic in nature, however, given she declined a heart monitor I did recommend she do not drive for 6 months from her syncopal episode. She does not desire to have lower extremity vascular testing done as ordered back in September. She does have +1 bilateral DP pulses.    She will continue all other cardiovascular medications. She will follow up in January with Dr. Mata. I previously offered to call her son to update him on plan of care. She stated she did not want me to call him and she will only tell him what she would like him to know.     Orders:  No orders of the defined types were placed in " this encounter.     Followup Appts:  Future Appointments   Date Time Provider Department Center   6/12/2024  9:15 AM SIS Mnotes [id number]   7/16/2024 10:30 AM MELECIO SILVA 2 QI [facility]   7/16/2024 11:30 AM SIS Helms [id number]   1/21/2025  9:00 AM Anil Mata MD [id number]           ____________________________________________________________  SIS Helms  [facility]

## 2024-05-16 NOTE — PATIENT INSTRUCTIONS
Continue current cardiovascular medications  Given you did not want to have monitor and had an episode of passing out in March I would recommend not driving until after September  Follow up in January with Dr. Mata

## 2024-06-04 ENCOUNTER — APPOINTMENT (OUTPATIENT)
Dept: OBSTETRICS AND GYNECOLOGY | Facility: CLINIC | Age: 84
End: 2024-06-04
Payer: MEDICARE

## 2024-06-12 ENCOUNTER — OFFICE VISIT (OUTPATIENT)
Dept: OBSTETRICS AND GYNECOLOGY | Facility: CLINIC | Age: 84
End: 2024-06-12
Payer: MEDICARE

## 2024-06-12 VITALS
BODY MASS INDEX: 23.78 KG/M2 | SYSTOLIC BLOOD PRESSURE: 131 MMHG | WEIGHT: 130 LBS | HEART RATE: 73 BPM | DIASTOLIC BLOOD PRESSURE: 63 MMHG

## 2024-06-12 DIAGNOSIS — N81.4 UTEROVAGINAL PROLAPSE: Primary | ICD-10-CM

## 2024-06-12 DIAGNOSIS — R30.0 DYSURIA: ICD-10-CM

## 2024-06-12 DIAGNOSIS — Z46.89 PESSARY MAINTENANCE: ICD-10-CM

## 2024-06-12 LAB
POC APPEARANCE, URINE: CLEAR
POC BILIRUBIN, URINE: NEGATIVE
POC BLOOD, URINE: ABNORMAL
POC COLOR, URINE: YELLOW
POC GLUCOSE, URINE: NEGATIVE MG/DL
POC KETONES, URINE: NEGATIVE MG/DL
POC LEUKOCYTES, URINE: ABNORMAL
POC NITRITE,URINE: POSITIVE
POC PH, URINE: 5.5 PH
POC PROTEIN, URINE: ABNORMAL MG/DL
POC SPECIFIC GRAVITY, URINE: 1.02
POC UROBILINOGEN, URINE: 0.2 EU/DL

## 2024-06-12 PROCEDURE — 1157F ADVNC CARE PLAN IN RCRD: CPT | Performed by: NURSE PRACTITIONER

## 2024-06-12 PROCEDURE — 99213 OFFICE O/P EST LOW 20 MIN: CPT | Performed by: NURSE PRACTITIONER

## 2024-06-12 PROCEDURE — 87086 URINE CULTURE/COLONY COUNT: CPT | Performed by: NURSE PRACTITIONER

## 2024-06-12 PROCEDURE — 3075F SYST BP GE 130 - 139MM HG: CPT | Performed by: NURSE PRACTITIONER

## 2024-06-12 PROCEDURE — 81003 URINALYSIS AUTO W/O SCOPE: CPT | Performed by: NURSE PRACTITIONER

## 2024-06-12 PROCEDURE — 1159F MED LIST DOCD IN RCRD: CPT | Performed by: NURSE PRACTITIONER

## 2024-06-12 PROCEDURE — 3078F DIAST BP <80 MM HG: CPT | Performed by: NURSE PRACTITIONER

## 2024-06-12 PROCEDURE — 1125F AMNT PAIN NOTED PAIN PRSNT: CPT | Performed by: NURSE PRACTITIONER

## 2024-06-12 ASSESSMENT — ENCOUNTER SYMPTOMS
ENDOCRINE NEGATIVE: 1
CONSTITUTIONAL NEGATIVE: 1
NEUROLOGICAL NEGATIVE: 1
PSYCHIATRIC NEGATIVE: 1
CARDIOVASCULAR NEGATIVE: 1
FREQUENCY: 1
GASTROINTESTINAL NEGATIVE: 1
MUSCULOSKELETAL NEGATIVE: 1
RESPIRATORY NEGATIVE: 1
EYES NEGATIVE: 1

## 2024-06-12 ASSESSMENT — PAIN SCALES - GENERAL: PAINLEVEL: 8

## 2024-06-12 NOTE — PROGRESS NOTES
Urogynecology Pessary Check Visit  Jaye Cao, APRN-CNP  [phone]      History of Present Illness:  HPI: Ms. Yin Harris  presents for a pessary check.     Last visit: 2/22/2024   Pessary type: #3 ring with support in place for uterovaginal prolapse   Bleeding? Yes - had pink/bloody discharge around 2 days ago noted on her pad  Discomfort? Denies - does endorse feeling the POP bulging around the pessary   Bowel or bladder symptoms: No changes to bowel or bladder habits   Vaginal estrogen: She is not using E2 at this time    Urinary Symptoms:  - She endorses urinary frequency and pain with sitting down to void/sitting in a chair. She feels symptomatic of a UTI today.   - Denies burning/painful dysuria when urinating.   - Does endorse pain with sitting down.     Past medical, surgical, social, family, allergy, and medication histories were reviewed and updated in EPIC charting.       Review of Systems   Constitutional: Negative.    HENT: Negative.     Eyes: Negative.    Respiratory: Negative.     Cardiovascular: Negative.    Gastrointestinal: Negative.    Endocrine: Negative.    Genitourinary:  Positive for frequency.   Musculoskeletal: Negative.    Neurological: Negative.    Psychiatric/Behavioral: Negative.          Objective    /63   Pulse 73   Wt 59 kg (130 lb)   BMI 23.78 kg/m²    Body mass index is 23.78 kg/m².     Physical Exam:  Physical Exam  Constitutional:       Appearance: Normal appearance.   HENT:      Head: Normocephalic and atraumatic.   Pulmonary:      Effort: Pulmonary effort is normal.   Psychiatric:         Mood and Affect: Mood normal.         Behavior: Behavior normal.         Thought Content: Thought content normal.         Judgment: Judgment normal.             Pelvic exam:   Speculum examination: The vagina and cervix were inspected and there is a bleeding erosion at the apex. Copious amount of bloody discharge is present.  There was bloody discharge noted on the  pessary.   Bimanual exam: The uterus was small and mobile.  There were no masses or tenderness in the pelvis/adnexal region.   The pessary was removed, cleaned and replaced without complications.        Assessment and Plan:    84 year old female with vaginal atrophy and uterovaginal prolapse. Comorbidities include: CAD, HTN, and GERD.     Diagnoses:  #1 Uterovaginal prolapse  #2 Pessary maintenance   #3 Dysuria    Plan:  1. Uterovaginal prolapse, bleeding erosion from the pessary, pessary management  - The #3 ring with support pessary was removed. Speculum exam did not reveal any granulation tissue but there was a bleeding erosion at the vaginal apex. Pessary was cleaned and replaced back. Of note, her pain with sitting down improved after the pessary was placed back.   - The POP bulges around the pessary and we discussed consideration of fitting her with a larger sized pessary vs. x2 week pessary holiday to allow time for the vaginal epithelium to heal. She is amenable to being fitted with a larger sized pessary as she had discomfort with sitting when the pessary was left out in office today but we were out of larger size pessary today.  > In the future we may have to fit her with a #4 ring with support pessary and was amenable to continuing to wear #3 ring with support at this time.   - Overall has satisfactory POP management with pessary and plans to continue current care.  She is not currently using tv estrogen cream.     2. Dysuria, ddx: Acute UTI  - POCT UA was positive for nitrites, large leukocytes, trace protein, and trace intact blood.   - Ordered urine to be sent for culture to evaluate for UTI given her pain with sitting down and increased urinary frequency.     Return to clinic in 3 months for a pessary check with SIS Wallace.     SIS Montes     Scribe Attestation  By signing my name below, Shorty DAVIS Scribe, attest that this documentation has been prepared under the  direction and in the presence of SIS Montes on 06/12/2024 at 4:22 PM.   I, SIS Montes, personally performed the services described in this documentation which was scribed virtually and I confirm that it is both accurate and complete.

## 2024-06-14 DIAGNOSIS — N39.0 ACUTE LOWER UTI: Primary | ICD-10-CM

## 2024-06-14 LAB — BACTERIA UR CULT: ABNORMAL

## 2024-06-14 RX ORDER — NITROFURANTOIN 25; 75 MG/1; MG/1
100 CAPSULE ORAL 2 TIMES DAILY
Qty: 14 CAPSULE | Refills: 0 | Status: SHIPPED | OUTPATIENT
Start: 2024-06-14 | End: 2024-06-21

## 2024-06-17 ENCOUNTER — TELEPHONE (OUTPATIENT)
Dept: OBSTETRICS AND GYNECOLOGY | Facility: CLINIC | Age: 84
End: 2024-06-17
Payer: MEDICARE

## 2024-06-17 NOTE — TELEPHONE ENCOUNTER
----- Message from SIS Montes sent at 6/14/2024  4:04 PM EDT -----  UTI, will prescribe Macrobid 100 mg, will send to pharmacy.

## 2024-06-17 NOTE — TELEPHONE ENCOUNTER
Called patient to notify of results and medications. Received voicemail, left message for patient to call the office.

## 2024-07-16 ENCOUNTER — APPOINTMENT (OUTPATIENT)
Dept: VASCULAR MEDICINE | Facility: HOSPITAL | Age: 84
End: 2024-07-16
Payer: MEDICARE

## 2024-07-16 ENCOUNTER — APPOINTMENT (OUTPATIENT)
Dept: CARDIOLOGY | Facility: HOSPITAL | Age: 84
End: 2024-07-16
Payer: MEDICARE

## 2024-09-12 ENCOUNTER — OFFICE VISIT (OUTPATIENT)
Dept: OBSTETRICS AND GYNECOLOGY | Facility: CLINIC | Age: 84
End: 2024-09-12
Payer: MEDICARE

## 2024-09-12 VITALS
HEIGHT: 62 IN | HEART RATE: 72 BPM | SYSTOLIC BLOOD PRESSURE: 135 MMHG | BODY MASS INDEX: 23.74 KG/M2 | WEIGHT: 129 LBS | DIASTOLIC BLOOD PRESSURE: 70 MMHG

## 2024-09-12 DIAGNOSIS — N81.4 UTEROVAGINAL PROLAPSE: Primary | ICD-10-CM

## 2024-09-12 DIAGNOSIS — Z46.89 PESSARY MAINTENANCE: ICD-10-CM

## 2024-09-12 PROCEDURE — 1159F MED LIST DOCD IN RCRD: CPT | Performed by: NURSE PRACTITIONER

## 2024-09-12 PROCEDURE — 99213 OFFICE O/P EST LOW 20 MIN: CPT | Performed by: NURSE PRACTITIONER

## 2024-09-12 PROCEDURE — 1126F AMNT PAIN NOTED NONE PRSNT: CPT | Performed by: NURSE PRACTITIONER

## 2024-09-12 PROCEDURE — 3075F SYST BP GE 130 - 139MM HG: CPT | Performed by: NURSE PRACTITIONER

## 2024-09-12 PROCEDURE — 3078F DIAST BP <80 MM HG: CPT | Performed by: NURSE PRACTITIONER

## 2024-09-12 PROCEDURE — 1157F ADVNC CARE PLAN IN RCRD: CPT | Performed by: NURSE PRACTITIONER

## 2024-09-12 ASSESSMENT — ENCOUNTER SYMPTOMS
HEMATOLOGIC/LYMPHATIC NEGATIVE: 1
FATIGUE: 1
DIZZINESS: 1
ALLERGIC/IMMUNOLOGIC NEGATIVE: 1
EYES NEGATIVE: 1
CARDIOVASCULAR NEGATIVE: 1
GASTROINTESTINAL NEGATIVE: 1
ENDOCRINE NEGATIVE: 1
MUSCULOSKELETAL NEGATIVE: 1
RESPIRATORY NEGATIVE: 1
PSYCHIATRIC NEGATIVE: 1

## 2024-09-12 ASSESSMENT — PAIN SCALES - GENERAL: PAINLEVEL: 0-NO PAIN

## 2024-09-12 NOTE — PROGRESS NOTES
"Urogynecology Pessary Check Visit  Jaye Cao, APRN-CNP  [phone]      History of Present Illness:    HPI    Ms. Yin Harris  presents for pessary check. She reports experiencing intermittent discharge with minimal spotting, which she does not find concerning. She has been experiencing vaginal tissue bulging around the pessary, which she has to continuously push back inside. The bulging is usually exacerbated by doing activities where she has to bend down. She is amenable to trying a larger pessary size today.     Last visit: 6/12/2024   Pessary type: #3 ring with support   Bleeding?: Minimal spotting   Discomfort?: Prolapse bulges around pessary during activity   Bowel or bladder symptoms: No changes   Vaginal estrogen: None     Past medical, surgical, social, family, allergy, and medication histories were reviewed and updated in EPIC charting.       Review of Systems   Constitutional:  Positive for fatigue.   HENT: Negative.     Eyes: Negative.    Respiratory: Negative.     Cardiovascular: Negative.    Gastrointestinal: Negative.    Endocrine: Negative.    Genitourinary: Negative.    Musculoskeletal: Negative.    Skin: Negative.    Allergic/Immunologic: Negative.    Neurological:  Positive for dizziness.   Hematological: Negative.    Psychiatric/Behavioral: Negative.           Objective    /70   Pulse 72   Ht 1.575 m (5' 2\")   Wt 58.5 kg (129 lb)   BMI 23.59 kg/m²    Body mass index is 23.59 kg/m².     Physical Exam:  Physical Exam  Constitutional:       Appearance: Normal appearance.   Genitourinary:      Vulva, bladder and urethral meatus normal.      Vaginal discharge (from pessary use) present.   HENT:      Head: Normocephalic and atraumatic.   Neurological:      General: No focal deficit present.      Mental Status: She is alert and oriented to person, place, and time.   Psychiatric:         Mood and Affect: Mood normal.         Behavior: Behavior normal.         Thought Content: " Thought content normal.         Judgment: Judgment normal.             Pelvic exam:   Speculum examination: The vagina and cervix were inspected and were free of erosions. No blood in vaginal vault. Presence of increased, normal, discharge from pessary use noted.  Bimanual exam: The uterus was wnl.  There were no masses or tenderness in the pelvis/adnexal region.   Refitted with #4 ring with support pessary, tolerated well.  The pessary was removed, cleaned and replaced without complications.       Plan:      Pessary management  - Satisfactory management with pessary with no erosions. Refitted with #4 ring with support.   - Return to clinic in 3 months or sooner if new pessary causes issues.     There are no diagnoses linked to this encounter.       Scribe Attestation  By signing my name below, ISujata Scribe, attest that this documentation has been prepared under the direction and in the presence of SIS Montes on 09/12/2024 at 12:34 PM.     I, SIS Montes, personally performed the services described in this documentation which was scribed virtually and I confirm that it is both accurate and complete.     SIS Montes

## 2024-12-31 ENCOUNTER — TELEPHONE (OUTPATIENT)
Dept: OBSTETRICS AND GYNECOLOGY | Facility: CLINIC | Age: 84
End: 2024-12-31
Payer: MEDICARE

## 2024-12-31 NOTE — TELEPHONE ENCOUNTER
Patient called by accident she was confuse with her different appointments and made an appointment with Jaye for January 2025

## 2025-01-06 ENCOUNTER — OFFICE VISIT (OUTPATIENT)
Dept: OBSTETRICS AND GYNECOLOGY | Facility: CLINIC | Age: 85
End: 2025-01-06
Payer: MEDICARE

## 2025-01-06 VITALS
BODY MASS INDEX: 23.67 KG/M2 | HEART RATE: 80 BPM | WEIGHT: 129.4 LBS | DIASTOLIC BLOOD PRESSURE: 69 MMHG | SYSTOLIC BLOOD PRESSURE: 150 MMHG

## 2025-01-06 DIAGNOSIS — N81.4 UTEROVAGINAL PROLAPSE: Primary | ICD-10-CM

## 2025-01-06 DIAGNOSIS — Z46.89 PESSARY MAINTENANCE: ICD-10-CM

## 2025-01-06 PROCEDURE — 1126F AMNT PAIN NOTED NONE PRSNT: CPT | Performed by: NURSE PRACTITIONER

## 2025-01-06 PROCEDURE — 99213 OFFICE O/P EST LOW 20 MIN: CPT | Performed by: NURSE PRACTITIONER

## 2025-01-06 PROCEDURE — 1157F ADVNC CARE PLAN IN RCRD: CPT | Performed by: NURSE PRACTITIONER

## 2025-01-06 PROCEDURE — 3078F DIAST BP <80 MM HG: CPT | Performed by: NURSE PRACTITIONER

## 2025-01-06 PROCEDURE — 3077F SYST BP >= 140 MM HG: CPT | Performed by: NURSE PRACTITIONER

## 2025-01-06 PROCEDURE — 1159F MED LIST DOCD IN RCRD: CPT | Performed by: NURSE PRACTITIONER

## 2025-01-06 ASSESSMENT — ENCOUNTER SYMPTOMS
ALLERGIC/IMMUNOLOGIC NEGATIVE: 1
CONSTITUTIONAL NEGATIVE: 1
EYES NEGATIVE: 1
CARDIOVASCULAR NEGATIVE: 1
GASTROINTESTINAL NEGATIVE: 1
PSYCHIATRIC NEGATIVE: 1
ENDOCRINE NEGATIVE: 1
NEUROLOGICAL NEGATIVE: 1
RESPIRATORY NEGATIVE: 1
MUSCULOSKELETAL NEGATIVE: 1
HEMATOLOGIC/LYMPHATIC NEGATIVE: 1

## 2025-01-06 ASSESSMENT — PAIN SCALES - GENERAL: PAINLEVEL_OUTOF10: 0-NO PAIN

## 2025-01-06 NOTE — PROGRESS NOTES
Urogynecology Pessary Check Visit  Jaye Cao, APRN-CNP  [phone]      History of Present Illness:    HPI    Ms. Yin Harris presents for pessary check. Reports the current pessary is functioning better than the previous one. Some days the pessary is bothersome and she experiences mild discharge. Mentions sitting frequently, which she feels contributes to the discomfort.     Last visit: 9/12/2024   Pessary type: #4 ring with support   Bleeding?: None reported   Discomfort?: Occasional   Bowel or bladder symptoms: No changes reported   Vaginal estrogen: None     Past medical, surgical, social, family, allergy, and medication histories were reviewed and updated in EPIC charting.       Review of Systems   Constitutional: Negative.    HENT: Negative.     Eyes: Negative.    Respiratory: Negative.     Cardiovascular: Negative.    Gastrointestinal: Negative.    Endocrine: Negative.    Genitourinary: Negative.    Musculoskeletal: Negative.    Skin: Negative.    Allergic/Immunologic: Negative.    Neurological: Negative.    Hematological: Negative.    Psychiatric/Behavioral: Negative.           Objective    /69 (BP Location: Right arm, Patient Position: Sitting)   Pulse 80   Wt 58.7 kg (129 lb 6.4 oz)   LMP  (Approximate)   BMI 23.67 kg/m²    Body mass index is 23.67 kg/m².     Physical Exam:  Physical Exam  Constitutional:       Appearance: Normal appearance.   Genitourinary:      Vulva, bladder and urethral meatus normal.      No lesions in the vagina.      Vaginal discharge present.      No vaginal bleeding.      Vaginal exam comments: Increased discharge from pessary use.      Pelvic exam was performed with patient in the lithotomy position.   HENT:      Head: Normocephalic and atraumatic.   Neurological:      General: No focal deficit present.      Mental Status: She is alert and oriented to person, place, and time.   Psychiatric:         Mood and Affect: Mood normal.         Behavior: Behavior  normal.         Thought Content: Thought content normal.         Judgment: Judgment normal.             Pelvic exam:   Speculum examination: The vagina and cervix were inspected and were free of erosions. No blood in vaginal vault. Normal discharge appreciated.    Bimanual exam: The uterus was wnl. There were no masses or tenderness in the pelvis/adnexal region.   The pessary was removed, cleaned and replaced without immediate complications.       Assessment/Plan:    Pessary management  - Satisfactory management with pessary with no erosions. Continue current care.    - Return to clinic in 3 months.      Scribe Attestation  By signing my name below, Sujata DAVIS Scribe, attest that this documentation has been prepared under the direction and in the presence of SIS Montes on 01/06/2025 at 2:29 PM.     I, SIS Montes, personally performed the services described in this documentation which was scribed virtually and I confirm that it is both accurate and complete.     SIS Montes

## 2025-01-07 ENCOUNTER — TELEPHONE (OUTPATIENT)
Dept: OBSTETRICS AND GYNECOLOGY | Facility: CLINIC | Age: 85
End: 2025-01-07
Payer: MEDICARE

## 2025-01-21 ENCOUNTER — OFFICE VISIT (OUTPATIENT)
Dept: CARDIOLOGY | Facility: HOSPITAL | Age: 85
End: 2025-01-21
Payer: MEDICARE

## 2025-01-21 ENCOUNTER — APPOINTMENT (OUTPATIENT)
Dept: CARDIOLOGY | Facility: HOSPITAL | Age: 85
End: 2025-01-21
Payer: MEDICARE

## 2025-01-21 VITALS
BODY MASS INDEX: 23.74 KG/M2 | HEART RATE: 84 BPM | WEIGHT: 129 LBS | OXYGEN SATURATION: 98 % | DIASTOLIC BLOOD PRESSURE: 68 MMHG | SYSTOLIC BLOOD PRESSURE: 128 MMHG | HEIGHT: 62 IN

## 2025-01-21 DIAGNOSIS — I25.2 PAST MYOCARDIAL INFARCTION: ICD-10-CM

## 2025-01-21 DIAGNOSIS — I25.10 CORONARY ARTERY DISEASE INVOLVING NATIVE CORONARY ARTERY OF NATIVE HEART WITHOUT ANGINA PECTORIS: Primary | ICD-10-CM

## 2025-01-21 DIAGNOSIS — I10 PRIMARY HYPERTENSION: ICD-10-CM

## 2025-01-21 DIAGNOSIS — E78.2 MIXED HYPERLIPIDEMIA: ICD-10-CM

## 2025-01-21 PROCEDURE — 1159F MED LIST DOCD IN RCRD: CPT | Performed by: INTERNAL MEDICINE

## 2025-01-21 PROCEDURE — 99214 OFFICE O/P EST MOD 30 MIN: CPT | Performed by: INTERNAL MEDICINE

## 2025-01-21 PROCEDURE — 3078F DIAST BP <80 MM HG: CPT | Performed by: INTERNAL MEDICINE

## 2025-01-21 PROCEDURE — 1160F RVW MEDS BY RX/DR IN RCRD: CPT | Performed by: INTERNAL MEDICINE

## 2025-01-21 PROCEDURE — 1036F TOBACCO NON-USER: CPT | Performed by: INTERNAL MEDICINE

## 2025-01-21 PROCEDURE — 3074F SYST BP LT 130 MM HG: CPT | Performed by: INTERNAL MEDICINE

## 2025-01-21 PROCEDURE — G2211 COMPLEX E/M VISIT ADD ON: HCPCS | Performed by: INTERNAL MEDICINE

## 2025-01-21 PROCEDURE — 1157F ADVNC CARE PLAN IN RCRD: CPT | Performed by: INTERNAL MEDICINE

## 2025-01-21 NOTE — PROGRESS NOTES
Primary Care Physician: Maria Ines Travis DO  Date of Visit: 01/21/2025 10:00 AM EST  Location of visit: [facility]     Chief Complaint:   Chief Complaint   Patient presents with    Follow-up        HPI / Summary:   Yin Harris is a 84 y.o. female who presents for followup.  She has no cardiac complaints.  She is able to walk up to 3 flights of stairs without chest pain or shortness of breath.  The patient denies chest pain, shortness of breath, palpitations, lightheadedness, syncope, orthopnea, paroxysmal nocturnal dyspnea, lower extremity edema, or bleeding problems.          Past Medical History:   Diagnosis Date    Atherosclerotic heart disease of native coronary artery without angina pectoris 07/12/2022    CAD (coronary artery disease)    Essential (primary) hypertension 07/12/2022    Hypertension    Hyperlipidemia, unspecified 07/12/2022    Hyperlipidemia    Personal history of other diseases of urinary system 04/05/2022    History of chronic kidney disease        Past Surgical History:   Procedure Laterality Date    MR HEAD ANGIO WO IV CONTRAST  7/11/2016    MR HEAD ANGIO WO IV CONTRAST 7/11/2016 [facility]GENCY [facility]GAC[facility]    MR NECK ANGIO WO IV CONTRAST  7/11/2016    MR NECK ANGIO WO IV CONTRAST 7/11/2016 [facility] EMERGENCY LEGACY    OTHER SURGICAL HISTORY  11/18/2014    Previous Stent Placement    OTHER SURGICAL HISTORY  03/04/2022    Cataract surgery    OTHER SURGICAL HISTORY  01/20/2022    Cholecystectomy    TONSILLECTOMY  03/04/2022    Tonsillectomy With Adenoidectomy          Social History:   reports that she quit smoking about 11 years ago. Her smoking use included cigarettes. She has never used smokeless tobacco.     Family History:  family history is not on file.      Allergies:  No Known Allergies    Outpatient Medications:  Current Outpatient Medications   Medication Instructions    aspirin 81 mg EC tablet 1 tablet, Daily    atorvastatin (LIPITOR) 80 mg, oral, Nightly    multivitamin with minerals  "iron-free (Centrum Silver) 1 tablet, Daily    polyethylene glycol (MIRALAX) 17 g, As needed       Physical Exam:  Vitals:    01/21/25 1024   BP: 128/68   BP Location: Right arm   Patient Position: Sitting   BP Cuff Size: Adult   Pulse: 84   SpO2: 98%   Weight: 58.5 kg (129 lb)   Height: 1.575 m (5' 2\")     Wt Readings from Last 5 Encounters:   01/21/25 58.5 kg (129 lb)   01/06/25 58.7 kg (129 lb 6.4 oz)   09/12/24 58.5 kg (129 lb)   06/12/24 59 kg (130 lb)   05/16/24 57.6 kg (127 lb)     Body mass index is 23.59 kg/m².   General: Well-developed well-nourished in no acute distress  HEENT: Normocephalic atraumatic  Neck: Supple, JVP is normal negative hepatojugular reflux 2+ carotid pulses without bruit  Pulmonary: Normal respiratory effort, clear to auscultation  Cardiovascular: No right ventricular heave, normal S1 and S2, no murmurs rubs or gallops  Abdomen: Soft nontender nondistended  Extremities: Warm without edema 2+ radial pulses bilaterally   Neurologic: Alert and oriented x3  Psychiatric: Normal mood and affect     Last Labs:  CMP:  Recent Labs     03/14/24  1110 05/23/23  0709 01/19/23  1113    143 142   K 4.6 4.5 4.2   * 109* 107   CO2 22 26 27   ANIONGAP 14 13 12   BUN 43* 36* 35*   CREATININE 1.85* 1.65* 1.78*   EGFR 27*  --   --    GLUCOSE 96 78 83     Recent Labs     03/14/24  1110 01/19/23  1113 01/05/22  1139   ALBUMIN 4.0 3.8 4.1   ALKPHOS 80 70 70   ALT 13 21 19   AST 19 26 24   BILITOT 0.6 0.5 0.5     CBC:  Recent Labs     03/14/24  1110 01/19/23  1113 03/04/22  1103   WBC 6.6 5.3 5.1   HGB 11.5* 11.7* 11.0*   HCT 37.4 38.2 36.3    195 228   MCV 97 96 96     COAG: No results for input(s): \"INR\", \"DDIMERVTE\" in the last 17544 hours.  HEME/ENDO:  Recent Labs     03/14/24  1110 08/16/19  1128   TSH 1.61 2.18      CARDIAC: No results for input(s): \"LDH\", \"CKMB\", \"TROPHS\", \"BNP\" in the last 80871 hours.    No lab exists for component: \"CK\", \"CKMBP\"  Recent Labs     03/14/24  1110 " 01/19/23  1113 01/05/22  1139 11/30/20  1141   CHOL 122 130 127 125   LDLF  --  59 51 54   LDLCALC 48  --   --   --    HDL 45.9 56.1 56.9 56.6   TRIG 143 77 94 70       Last Cardiology Tests:  ECG:  I reviewed electrocardiogram from March 14, 2024 that shows normal sinus rhythm and is normal.    Echo:  Echocardiogram April 18, 2024  CONCLUSIONS:   1. Left ventricular systolic function is normal with a 60-65% estimated ejection fraction.   2. Spectral Doppler shows a pseudonormal pattern of left ventricular diastolic filling.   3. Moderate mitral valve regurgitation.   4. Mild to moderate tricuspid regurgitation visualized.   5. Mildly elevated RVSP.   6. There is moderate aortic valve cusp calcification.       Cath:   Cardiac catheterization October 2014      * The LAD and left circumflex arise from separate ostia from the left      cusp.      * LAD: Mild diffuse disease up to 20-30% in the mid vessel.      * Left circumflex: Codominant vessel with mild diffuse disease. OM1 a      small with mild disease. OM2 is relatively large with a proximal      thrombotic 99% stenosis (culprit) with JOSHUA 1 flow.      * RCA: Codominant vessel with mid 80% stenosis at the takeoff of an      acute marginal branch.      * Elevated LVEDP. No aortic stenosis.      * Successful PCI of OM 2 with a 2.25 x 24 mm Promus Premier drug-eluting      stent post dilated up to 2.5 mm.    Stress Test:  Stress Results:  No results found for this or any previous visit from the past 365 days.         Cardiac Imaging:        Assessment/Plan   Diagnoses and all orders for this visit:  Coronary artery disease involving native coronary artery of native heart without angina pectoris  -     CBC; Future  Mixed hyperlipidemia  -     Lipid Panel; Future  -     Comprehensive Metabolic Panel; Future  Primary hypertension  Past myocardial infarction    In summary Ms. Harris is a pleasant 84 year old white female with a past medical history significant for  coronary artery disease, status post posterior lateral ST elevation myocardial infarction and drug-eluting stent placement in the obtuse marginal branch with residual disease in her RCA and preserved left ventricular systolic function, hypertension, hyperlipidemia, chronic kidney disease, and prior chronic tobacco use.  She is asymptomatic from a cardiac perspective.  She is euvolemic on exam.  Her blood pressure and lipids are at goal.  She should continue her current cardiovascular medications.  I ordered fasting blood work as below.  I encouraged her to increase her physical activity.  We will see her back in follow-up in 6 months.      Orders:  Orders Placed This Encounter   Procedures    Lipid Panel    Comprehensive Metabolic Panel    CBC      Followup Appts:  Future Appointments   Date Time Provider Department Center   4/15/2025 10:45 AM FAWAD Montes-CNP [id number]           ____________________________________________________________  Anil Mata MD  [facility]  [facility]

## 2025-03-21 ENCOUNTER — TELEPHONE (OUTPATIENT)
Dept: OBSTETRICS AND GYNECOLOGY | Facility: CLINIC | Age: 85
End: 2025-03-21
Payer: MEDICARE

## 2025-03-21 DIAGNOSIS — R30.0 DYSURIA: Primary | ICD-10-CM

## 2025-03-21 RX ORDER — NITROFURANTOIN 25; 75 MG/1; MG/1
100 CAPSULE ORAL 2 TIMES DAILY
Qty: 14 CAPSULE | Refills: 0 | Status: SHIPPED | OUTPATIENT
Start: 2025-03-21 | End: 2025-03-28

## 2025-03-21 NOTE — TELEPHONE ENCOUNTER
Yin Harris requested antibiotic for UTI sx; she is unable to give samplebefore weekend. Jaye aware and sent Antibiotic: Macrobid to local pharmacy per Jaye Cao CNP. Questions answered and understanding verbalized.

## 2025-03-21 NOTE — TELEPHONE ENCOUNTER
Patient having UTI symptoms. States she does not have a car and is unable to go to lab for culture and that Jaye is aware of this. Requesting RX for treatment.

## 2025-04-02 DIAGNOSIS — E78.2 MIXED HYPERLIPIDEMIA: ICD-10-CM

## 2025-04-02 DIAGNOSIS — I25.10 CORONARY ARTERY DISEASE INVOLVING NATIVE CORONARY ARTERY OF NATIVE HEART WITHOUT ANGINA PECTORIS: ICD-10-CM

## 2025-04-02 RX ORDER — ATORVASTATIN CALCIUM 80 MG/1
80 TABLET, FILM COATED ORAL NIGHTLY
Qty: 90 TABLET | Refills: 3 | Status: SHIPPED | OUTPATIENT
Start: 2025-04-02 | End: 2026-04-02

## 2025-04-04 ENCOUNTER — TELEPHONE (OUTPATIENT)
Dept: OBSTETRICS AND GYNECOLOGY | Facility: CLINIC | Age: 85
End: 2025-04-04
Payer: MEDICARE

## 2025-04-04 NOTE — TELEPHONE ENCOUNTER
"Patient stated she is \"passing\" blood. She stated when she use the restroom and wipe there is blood  "

## 2025-04-04 NOTE — TELEPHONE ENCOUNTER
All questions answered, & Yin Harris verbalized understanding. Pt complaining of discomfort and spotting and will keep her up coming appt

## 2025-04-08 ENCOUNTER — APPOINTMENT (OUTPATIENT)
Dept: OBSTETRICS AND GYNECOLOGY | Facility: CLINIC | Age: 85
End: 2025-04-08
Payer: MEDICARE

## 2025-04-09 ENCOUNTER — TELEPHONE (OUTPATIENT)
Dept: OBSTETRICS AND GYNECOLOGY | Facility: CLINIC | Age: 85
End: 2025-04-09
Payer: MEDICARE

## 2025-04-09 NOTE — TELEPHONE ENCOUNTER
Experienced severe bleeding yesterday to the point of  ruined clothing. Feels better today, very little blood today.

## 2025-04-10 ENCOUNTER — APPOINTMENT (OUTPATIENT)
Dept: OBSTETRICS AND GYNECOLOGY | Facility: CLINIC | Age: 85
End: 2025-04-10
Payer: MEDICARE

## 2025-04-10 NOTE — TELEPHONE ENCOUNTER
Returned patient call regarding bleeding. 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 Today, 4/10/25 patient denies bleeding or pain.

## 2025-04-15 ENCOUNTER — OFFICE VISIT (OUTPATIENT)
Dept: OBSTETRICS AND GYNECOLOGY | Facility: CLINIC | Age: 85
End: 2025-04-15
Payer: MEDICARE

## 2025-04-15 VITALS
HEIGHT: 62 IN | BODY MASS INDEX: 23.96 KG/M2 | WEIGHT: 130.2 LBS | SYSTOLIC BLOOD PRESSURE: 80 MMHG | HEART RATE: 87 BPM | DIASTOLIC BLOOD PRESSURE: 54 MMHG

## 2025-04-15 DIAGNOSIS — N81.4 UTEROVAGINAL PROLAPSE: Primary | ICD-10-CM

## 2025-04-15 DIAGNOSIS — Z46.89 PESSARY MAINTENANCE: ICD-10-CM

## 2025-04-15 PROCEDURE — 3078F DIAST BP <80 MM HG: CPT | Performed by: NURSE PRACTITIONER

## 2025-04-15 PROCEDURE — 1159F MED LIST DOCD IN RCRD: CPT | Performed by: NURSE PRACTITIONER

## 2025-04-15 PROCEDURE — 1126F AMNT PAIN NOTED NONE PRSNT: CPT | Performed by: NURSE PRACTITIONER

## 2025-04-15 PROCEDURE — 99213 OFFICE O/P EST LOW 20 MIN: CPT | Performed by: NURSE PRACTITIONER

## 2025-04-15 PROCEDURE — 1157F ADVNC CARE PLAN IN RCRD: CPT | Performed by: NURSE PRACTITIONER

## 2025-04-15 PROCEDURE — 3074F SYST BP LT 130 MM HG: CPT | Performed by: NURSE PRACTITIONER

## 2025-04-15 ASSESSMENT — ENCOUNTER SYMPTOMS
EYES NEGATIVE: 1
ENDOCRINE NEGATIVE: 1
RESPIRATORY NEGATIVE: 1
CARDIOVASCULAR NEGATIVE: 1
MUSCULOSKELETAL NEGATIVE: 1
PSYCHIATRIC NEGATIVE: 1
CONSTITUTIONAL NEGATIVE: 1
ALLERGIC/IMMUNOLOGIC NEGATIVE: 1
NEUROLOGICAL NEGATIVE: 1
HEMATOLOGIC/LYMPHATIC NEGATIVE: 1
GASTROINTESTINAL NEGATIVE: 1

## 2025-04-15 ASSESSMENT — PAIN SCALES - GENERAL: PAINLEVEL_OUTOF10: 0-NO PAIN

## 2025-04-15 NOTE — PROGRESS NOTES
"Urogynecology Pessary Check Visit  Jaye Cao, APRN-CNP  [phone]      History of Present Illness:    HPI    Ms. Yin Harris presents for pessary check.     Last visit: 1/6/2025   Pessary type: #4 ring with support   Bleeding?: Reports severe bleeding, with an episode where \"everything came out\".   Discomfort?: Pain with sitting. Cramps, unsure if they are localized in vagina or intestines. Occasional green discharge.   Bowel or bladder symptoms: Denies current sx of UTI.  Vaginal estrogen: None     Past medical, surgical, social, family, allergy, and medication histories were reviewed and updated in EPIC charting.       Review of Systems   Constitutional: Negative.    HENT: Negative.     Eyes: Negative.    Respiratory: Negative.     Cardiovascular: Negative.    Gastrointestinal: Negative.    Endocrine: Negative.    Genitourinary: Negative.    Musculoskeletal: Negative.    Skin: Negative.    Allergic/Immunologic: Negative.    Neurological: Negative.    Hematological: Negative.    Psychiatric/Behavioral: Negative.           Objective    BP 80/54 (Patient Position: Standing)   Pulse 87   Ht 1.575 m (5' 2\")   Wt 59.1 kg (130 lb 3.2 oz)   BMI 23.81 kg/m²    Body mass index is 23.81 kg/m².     Physical Exam:  Physical Exam  Constitutional:       Appearance: Normal appearance.   Genitourinary:      Vulva, bladder and urethral meatus normal.      No vaginal bleeding.      Vaginal exam comments: Healthy vaginal tissues.      Uterus is not absent.     Urethra exam comments: No urethral bleeding.      Pelvic exam was performed with patient in the lithotomy position.   HENT:      Head: Normocephalic and atraumatic.   Neurological:      General: No focal deficit present.      Mental Status: She is alert and oriented to person, place, and time.   Psychiatric:         Mood and Affect: Mood normal.         Behavior: Behavior normal.         Thought Content: Thought content normal.         Judgment: Judgment " normal.       Pelvic exam:   Speculum examination: The vagina and cervix were inspected and were free of erosions. No blood in vaginal vault. Normal discharge appreciated.    Bimanual exam: The uterus was wnl. There were no masses or tenderness in the pelvis/adnexal region.   The pessary was removed, cleaned, and replaced without immediate complications.       Assessment and Plan:  85 y.o. female presenting for pessary maintenance. Comorbidities include: CAD, HLD, HTN, GERD, CKD stage 3.    Diagnosis List:  #1 Pessary maintenance    Plan:  1. Pessary maintenance  - Satisfactory management with pessary with no erosions. Continue current care.    - Pessary was removed, cleaned, and replaced without immediate complications.  - Patient declines pelvic US to evaluate for uterine bleeding.   - Return to clinic in 3 months.      Scribe Attestation  By signing my name below, Sujata DAVIS Scribe, attest that this documentation has been prepared under the direction and in the presence of SIS Montes on 04/15/2025 at 2:56 PM.     I, SIS Montes, personally performed the services described in this documentation which was scribed virtually and I confirm that it is both accurate and complete.     SIS Montes

## 2025-07-15 ENCOUNTER — OFFICE VISIT (OUTPATIENT)
Dept: OBSTETRICS AND GYNECOLOGY | Facility: CLINIC | Age: 85
End: 2025-07-15
Payer: MEDICARE

## 2025-07-15 VITALS
DIASTOLIC BLOOD PRESSURE: 70 MMHG | SYSTOLIC BLOOD PRESSURE: 135 MMHG | HEIGHT: 62 IN | HEART RATE: 80 BPM | WEIGHT: 128.2 LBS | BODY MASS INDEX: 23.59 KG/M2

## 2025-07-15 DIAGNOSIS — Z46.89 PESSARY MAINTENANCE: ICD-10-CM

## 2025-07-15 DIAGNOSIS — N39.0 RECURRENT UTI: ICD-10-CM

## 2025-07-15 LAB
POC APPEARANCE, URINE: ABNORMAL
POC BILIRUBIN, URINE: NEGATIVE
POC BLOOD, URINE: ABNORMAL
POC COLOR, URINE: YELLOW
POC GLUCOSE, URINE: NEGATIVE MG/DL
POC KETONES, URINE: NEGATIVE MG/DL
POC LEUKOCYTES, URINE: ABNORMAL
POC NITRITE,URINE: POSITIVE
POC PH, URINE: 5.5 PH
POC PROTEIN, URINE: ABNORMAL MG/DL
POC SPECIFIC GRAVITY, URINE: 1.02
POC UROBILINOGEN, URINE: 0.2 EU/DL

## 2025-07-15 PROCEDURE — 99212 OFFICE O/P EST SF 10 MIN: CPT | Performed by: NURSE PRACTITIONER

## 2025-07-15 PROCEDURE — 1126F AMNT PAIN NOTED NONE PRSNT: CPT | Performed by: NURSE PRACTITIONER

## 2025-07-15 PROCEDURE — 1159F MED LIST DOCD IN RCRD: CPT | Performed by: NURSE PRACTITIONER

## 2025-07-15 PROCEDURE — 81003 URINALYSIS AUTO W/O SCOPE: CPT | Mod: QW | Performed by: NURSE PRACTITIONER

## 2025-07-15 PROCEDURE — 3075F SYST BP GE 130 - 139MM HG: CPT | Performed by: NURSE PRACTITIONER

## 2025-07-15 PROCEDURE — 3078F DIAST BP <80 MM HG: CPT | Performed by: NURSE PRACTITIONER

## 2025-07-15 ASSESSMENT — ENCOUNTER SYMPTOMS
MUSCULOSKELETAL NEGATIVE: 1
ALLERGIC/IMMUNOLOGIC NEGATIVE: 1
PSYCHIATRIC NEGATIVE: 1
LOSS OF SENSATION IN FEET: 0
CARDIOVASCULAR NEGATIVE: 1
GASTROINTESTINAL NEGATIVE: 1
ENDOCRINE NEGATIVE: 1
CONSTITUTIONAL NEGATIVE: 1
DEPRESSION: 0
OCCASIONAL FEELINGS OF UNSTEADINESS: 0
NEUROLOGICAL NEGATIVE: 1
HEMATOLOGIC/LYMPHATIC NEGATIVE: 1
EYES NEGATIVE: 1
RESPIRATORY NEGATIVE: 1

## 2025-07-15 ASSESSMENT — PAIN SCALES - GENERAL: PAINLEVEL_OUTOF10: 0-NO PAIN

## 2025-07-15 NOTE — PROGRESS NOTES
"Urogynecology Pessary Check Visit  Jaye Cao, APRN-CNP  [phone]      History of Present Illness:    HPI    Ms. Yin Harris presents for pessary check. Expresses frustration with her current health status including dementia and Alzheimer's.     Last visit: 4/15/2025   Pessary type: #4 ring with support. Patient's son requests information on prolapse correction surgery.   Bleeding?: Denies   Discomfort?: Denies   Bowel or bladder symptoms: Suspects UTI, amenable to do a urine test.   Vaginal estrogen: None     Past medical, surgical, social, family, allergy, and medication histories were reviewed and updated in EPIC charting.        UA: +blood, +protein, +nitrite, +leukocytes, cloudy appearance     Review of Systems   Constitutional: Negative.    HENT: Negative.     Eyes: Negative.    Respiratory: Negative.     Cardiovascular: Negative.    Gastrointestinal: Negative.    Endocrine: Negative.    Genitourinary: Negative.    Musculoskeletal: Negative.    Skin: Negative.    Allergic/Immunologic: Negative.    Neurological: Negative.    Hematological: Negative.    Psychiatric/Behavioral: Negative.           Objective    /70 (Patient Position: Sitting)   Pulse 80   Ht 1.575 m (5' 2\")   Wt 58.2 kg (128 lb 3.2 oz)   BMI 23.45 kg/m²    Body mass index is 23.45 kg/m².     Physical Exam:  Physical Exam  Constitutional:       Appearance: Normal appearance.   Genitourinary:      Vulva, bladder and urethral meatus normal.      No vaginal bleeding.      Pelvic exam was performed with patient in the lithotomy position.   HENT:      Head: Normocephalic and atraumatic.   Neurological:      General: No focal deficit present.      Mental Status: She is alert and oriented to person, place, and time.   Psychiatric:         Mood and Affect: Mood normal.         Behavior: Behavior normal.         Thought Content: Thought content normal.         Judgment: Judgment normal.       Pelvic exam:   Speculum examination: The " vagina and cervix were inspected and were free of erosions. No blood in vaginal vault. Normal discharge appreciated.    Bimanual exam: The uterus was wnl. There were no masses or tenderness in the pelvis/adnexal region.   The pessary was removed, cleaned, and replaced without immediate complications.       Assessment and Plan:  85 y.o. female with Arabella presents for pessary maintenance. Comorbidities include: CAD, HLD, HTN, GERD, CKD stage 3.    Diagnosis List:  #1 Pessary maintenance  #2 Arabella    Plan:  1. Pessary maintenance  - Satisfactory management with pessary with no erosions. Continue current care.    - Pessary was removed, cleaned, and replaced without immediate complications.  - Provided patient's son with information on prolapse surgical correction.   - Follow up in Urogynecology.     2. Arabella  - POCT UA automated manually resulted.  - Urine sample sent for culture testing; results expected within 48-72 hours.  - Discussed with the patient's son about the urine culture and follow up based on results.     Follow up in 3 months with SIS Montes.      Scribe Attestation  By signing my name below, ISujata Scribe, attest that this documentation has been prepared under the direction and in the presence of SIS Montes on 07/15/2025 at 12:16 PM.     SPEKE audio duration: 11 minutes    I spent a total of 17 minutes in face to face and non face to face time.      Agree with above. I Jaye CELESTIN personally performed the services described in the documentation which was scribed virtually and confirm it is both complete and accurate.  SIS Leyva

## 2025-07-18 ENCOUNTER — RESULTS FOLLOW-UP (OUTPATIENT)
Dept: OBSTETRICS AND GYNECOLOGY | Facility: CLINIC | Age: 85
End: 2025-07-18
Payer: MEDICARE

## 2025-07-18 DIAGNOSIS — N39.0 RECURRENT UTI: Primary | ICD-10-CM

## 2025-07-18 LAB — BACTERIA UR CULT: ABNORMAL

## 2025-07-18 RX ORDER — NITROFURANTOIN 25; 75 MG/1; MG/1
100 CAPSULE ORAL 2 TIMES DAILY
Qty: 14 CAPSULE | Refills: 0 | Status: SHIPPED | OUTPATIENT
Start: 2025-07-18 | End: 2025-07-25

## 2025-07-18 NOTE — TELEPHONE ENCOUNTER
----- Message from Jaye Cao sent at 7/18/2025  2:29 PM EDT -----  UTI, will treat with macrobid, sent to pharmacy   ----- Message -----  From: Ella Garcia MA  Sent: 7/15/2025   9:54 AM EDT  To: FAWAD Montes-CNP

## 2025-07-18 NOTE — TELEPHONE ENCOUNTER
Result Communication    Resulted Orders   POCT UA Automated manually resulted   Result Value Ref Range    POC Color, Urine Yellow Straw, Yellow, Light-Yellow    POC Appearance, Urine Cloudy (A) Clear    POC Glucose, Urine NEGATIVE NEGATIVE mg/dl    POC Bilirubin, Urine NEGATIVE NEGATIVE    POC Ketones, Urine NEGATIVE NEGATIVE mg/dl    POC Specific Gravity, Urine 1.020 1.005 - 1.035    POC Blood, Urine TRACE-Intact (A) NEGATIVE    POC PH, Urine 5.5 No Reference Range Established PH    POC Protein, Urine 30 (1+) (A) NEGATIVE mg/dl    POC Urobilinogen, Urine 0.2 0.2, 1.0 EU/DL    Poc Nitrite, Urine POSITIVE (A) NEGATIVE    POC Leukocytes, Urine SMALL (1+) (A) NEGATIVE   Urine Culture   Result Value Ref Range    CULTURE, URINE, ROUTINE SEE NOTE (A)       Comment:          CULTURE, URINE, ROUTINE         Micro Number:      [id number]    Test Status:       Final    Specimen Source:   Urine    Specimen Quality:  Adequate    Result:            Greater than 100,000 CFU/mL of Escherichia coli                              E.coli                            ----------------                            INT   HARINI     AMOX/CLAVULANATE       S     4     AMP/SULBACTAM          S     <=2     CEFAZOLIN              NR    <=1 **2     CEFEPIME               S     <=0.12     CEFTAZIDIME            S     <=0.5     CEFTRIAXONE            S     <=0.25     CIPROFLOXACIN          S     <=0.06     GENTAMICIN             S     <=1     IMIPENEM               S     <=0.25     LEVOFLOXACIN           S     <=0.12     MEROPENEM              S     <=0.25     NITROFURANTOIN         S     <=16     PIP/TAZOBACTAM         S     <=4     TRIMETHOPRIM/SULFA     S     <=20    S = Susceptible  I = Intermediate  R = Resistant  NS = Not susceptible  SDD = Susceptible Dose Dependent  * = Not Albertina irene  NR = Not Reported  **NN = See Therapy Comments      THERAPY COMMENTS        Note 1:      For infections other than uncomplicated UTI      caused by E. coli, K.  pneumoniae or P. mirabilis:      Cefazolin is resistant if HARINI > or = 8 mcg/mL.      (Distinguishing susceptible versus intermediate      for isolates with HARINI < or = 4 mcg/mL requires      additional testing.)        Note 2:      For uncomplicated UTI caused by E. coli,      K. pneumoniae or P. mirabilis: Cefazolin is      susceptible if HARINI <32 mcg/mL and predicts      susceptible to the oral agents cefaclor, cefdinir,      cefpodoxime, cefprozil, cefuroxime, cephalexin      and loracarbef.         4:12 PM      Results were successfully communicated with the patient and they acknowledged their understanding.

## 2025-07-23 ENCOUNTER — APPOINTMENT (OUTPATIENT)
Dept: CARDIOLOGY | Facility: HOSPITAL | Age: 85
End: 2025-07-23
Payer: MEDICARE

## 2025-07-28 ENCOUNTER — APPOINTMENT (OUTPATIENT)
Dept: CARDIOLOGY | Facility: HOSPITAL | Age: 85
End: 2025-07-28
Payer: MEDICARE

## 2025-08-16 ENCOUNTER — APPOINTMENT (OUTPATIENT)
Dept: CARDIOLOGY | Facility: HOSPITAL | Age: 85
End: 2025-08-16
Payer: MEDICARE

## 2025-08-16 ENCOUNTER — APPOINTMENT (OUTPATIENT)
Dept: RADIOLOGY | Facility: HOSPITAL | Age: 85
End: 2025-08-16
Payer: MEDICARE

## 2025-08-16 ENCOUNTER — HOSPITAL ENCOUNTER (EMERGENCY)
Facility: HOSPITAL | Age: 85
Discharge: HOME | End: 2025-08-16
Attending: EMERGENCY MEDICINE
Payer: MEDICARE

## 2025-08-16 VITALS
SYSTOLIC BLOOD PRESSURE: 176 MMHG | BODY MASS INDEX: 23.37 KG/M2 | TEMPERATURE: 98.1 F | DIASTOLIC BLOOD PRESSURE: 68 MMHG | OXYGEN SATURATION: 99 % | WEIGHT: 127 LBS | RESPIRATION RATE: 17 BRPM | HEART RATE: 74 BPM | HEIGHT: 62 IN

## 2025-08-16 DIAGNOSIS — A49.9 BACTERIAL UTI: ICD-10-CM

## 2025-08-16 DIAGNOSIS — R44.3 HALLUCINATIONS: Primary | ICD-10-CM

## 2025-08-16 DIAGNOSIS — N39.0 BACTERIAL UTI: ICD-10-CM

## 2025-08-16 LAB
ALBUMIN SERPL BCP-MCNC: 4.1 G/DL (ref 3.4–5)
ALP SERPL-CCNC: 71 U/L (ref 33–136)
ALT SERPL W P-5'-P-CCNC: 18 U/L (ref 7–45)
AMPHETAMINES UR QL SCN: NORMAL
ANION GAP SERPL CALC-SCNC: 14 MMOL/L (ref 10–20)
APAP SERPL-MCNC: <10 UG/ML (ref ?–30)
APPEARANCE UR: ABNORMAL
AST SERPL W P-5'-P-CCNC: 26 U/L (ref 9–39)
BACTERIA #/AREA URNS AUTO: ABNORMAL /HPF
BARBITURATES UR QL SCN: NORMAL
BASOPHILS # BLD AUTO: 0.04 X10*3/UL (ref 0–0.1)
BASOPHILS NFR BLD AUTO: 0.8 %
BENZODIAZ UR QL SCN: NORMAL
BILIRUB SERPL-MCNC: 1 MG/DL (ref 0–1.2)
BILIRUB UR STRIP.AUTO-MCNC: NEGATIVE MG/DL
BUN SERPL-MCNC: 27 MG/DL (ref 6–23)
BZE UR QL SCN: NORMAL
CALCIUM SERPL-MCNC: 9.8 MG/DL (ref 8.6–10.3)
CANNABINOIDS UR QL SCN: NORMAL
CHLORIDE SERPL-SCNC: 110 MMOL/L (ref 98–107)
CO2 SERPL-SCNC: 22 MMOL/L (ref 21–32)
COLOR UR: ABNORMAL
CREAT SERPL-MCNC: 2.03 MG/DL (ref 0.5–1.05)
EGFRCR SERPLBLD CKD-EPI 2021: 24 ML/MIN/1.73M*2
EOSINOPHIL # BLD AUTO: 0.13 X10*3/UL (ref 0–0.4)
EOSINOPHIL NFR BLD AUTO: 2.7 %
ERYTHROCYTE [DISTWIDTH] IN BLOOD BY AUTOMATED COUNT: 14.4 % (ref 11.5–14.5)
ETHANOL SERPL-MCNC: <10 MG/DL
FENTANYL+NORFENTANYL UR QL SCN: NORMAL
GLUCOSE SERPL-MCNC: 99 MG/DL (ref 74–99)
GLUCOSE UR STRIP.AUTO-MCNC: NORMAL MG/DL
HCT VFR BLD AUTO: 35.6 % (ref 36–46)
HGB BLD-MCNC: 11.5 G/DL (ref 12–16)
HYALINE CASTS #/AREA URNS AUTO: ABNORMAL /LPF
IMM GRANULOCYTES # BLD AUTO: 0.02 X10*3/UL (ref 0–0.5)
IMM GRANULOCYTES NFR BLD AUTO: 0.4 % (ref 0–0.9)
KETONES UR STRIP.AUTO-MCNC: NEGATIVE MG/DL
LEUKOCYTE ESTERASE UR QL STRIP.AUTO: ABNORMAL
LYMPHOCYTES # BLD AUTO: 1.26 X10*3/UL (ref 0.8–3)
LYMPHOCYTES NFR BLD AUTO: 25.9 %
MCH RBC QN AUTO: 30 PG (ref 26–34)
MCHC RBC AUTO-ENTMCNC: 32.3 G/DL (ref 32–36)
MCV RBC AUTO: 93 FL (ref 80–100)
METHADONE UR QL SCN: NORMAL
MONOCYTES # BLD AUTO: 0.49 X10*3/UL (ref 0.05–0.8)
MONOCYTES NFR BLD AUTO: 10.1 %
MUCOUS THREADS #/AREA URNS AUTO: ABNORMAL /LPF
NEUTROPHILS # BLD AUTO: 2.93 X10*3/UL (ref 1.6–5.5)
NEUTROPHILS NFR BLD AUTO: 60.1 %
NITRITE UR QL STRIP.AUTO: ABNORMAL
NRBC BLD-RTO: 0 /100 WBCS (ref 0–0)
OPIATES UR QL SCN: NORMAL
OXYCODONE+OXYMORPHONE UR QL SCN: NORMAL
PCP UR QL SCN: NORMAL
PH UR STRIP.AUTO: 5.5 [PH]
PLATELET # BLD AUTO: 170 X10*3/UL (ref 150–450)
POTASSIUM SERPL-SCNC: 3.7 MMOL/L (ref 3.5–5.3)
PROT SERPL-MCNC: 7 G/DL (ref 6.4–8.2)
PROT UR STRIP.AUTO-MCNC: ABNORMAL MG/DL
RBC # BLD AUTO: 3.83 X10*6/UL (ref 4–5.2)
RBC # UR STRIP.AUTO: ABNORMAL MG/DL
RBC #/AREA URNS AUTO: ABNORMAL /HPF
SALICYLATES SERPL-MCNC: <3 MG/DL (ref ?–20)
SODIUM SERPL-SCNC: 142 MMOL/L (ref 136–145)
SP GR UR STRIP.AUTO: 1.01
SQUAMOUS #/AREA URNS AUTO: ABNORMAL /HPF
UROBILINOGEN UR STRIP.AUTO-MCNC: NORMAL MG/DL
WBC # BLD AUTO: 4.9 X10*3/UL (ref 4.4–11.3)
WBC #/AREA URNS AUTO: >50 /HPF

## 2025-08-16 PROCEDURE — 80320 DRUG SCREEN QUANTALCOHOLS: CPT | Performed by: EMERGENCY MEDICINE

## 2025-08-16 PROCEDURE — 99285 EMERGENCY DEPT VISIT HI MDM: CPT | Mod: 25 | Performed by: EMERGENCY MEDICINE

## 2025-08-16 PROCEDURE — 93005 ELECTROCARDIOGRAM TRACING: CPT

## 2025-08-16 PROCEDURE — 70450 CT HEAD/BRAIN W/O DYE: CPT

## 2025-08-16 PROCEDURE — 70450 CT HEAD/BRAIN W/O DYE: CPT | Performed by: RADIOLOGY

## 2025-08-16 PROCEDURE — 81001 URINALYSIS AUTO W/SCOPE: CPT | Mod: 59 | Performed by: EMERGENCY MEDICINE

## 2025-08-16 PROCEDURE — 87077 CULTURE AEROBIC IDENTIFY: CPT | Mod: AHULAB | Performed by: EMERGENCY MEDICINE

## 2025-08-16 PROCEDURE — 80053 COMPREHEN METABOLIC PANEL: CPT | Performed by: EMERGENCY MEDICINE

## 2025-08-16 PROCEDURE — 36415 COLL VENOUS BLD VENIPUNCTURE: CPT | Performed by: EMERGENCY MEDICINE

## 2025-08-16 PROCEDURE — 80307 DRUG TEST PRSMV CHEM ANLYZR: CPT | Performed by: EMERGENCY MEDICINE

## 2025-08-16 PROCEDURE — 2500000001 HC RX 250 WO HCPCS SELF ADMINISTERED DRUGS (ALT 637 FOR MEDICARE OP): Performed by: EMERGENCY MEDICINE

## 2025-08-16 PROCEDURE — 85025 COMPLETE CBC W/AUTO DIFF WBC: CPT | Performed by: EMERGENCY MEDICINE

## 2025-08-16 RX ORDER — CIPROFLOXACIN 500 MG/1
500 TABLET, FILM COATED ORAL 2 TIMES DAILY
Qty: 14 TABLET | Refills: 0 | Status: SHIPPED | OUTPATIENT
Start: 2025-08-16 | End: 2025-08-21 | Stop reason: WASHOUT

## 2025-08-16 RX ORDER — CIPROFLOXACIN 500 MG/1
500 TABLET, FILM COATED ORAL ONCE
Status: COMPLETED | OUTPATIENT
Start: 2025-08-16 | End: 2025-08-16

## 2025-08-16 RX ORDER — MIDAZOLAM HYDROCHLORIDE 1 MG/ML
2 INJECTION, SOLUTION INTRAMUSCULAR; INTRAVENOUS ONCE
Status: DISCONTINUED | OUTPATIENT
Start: 2025-08-16 | End: 2025-08-16

## 2025-08-16 RX ADMIN — CIPROFLOXACIN 500 MG: 500 TABLET ORAL at 12:51

## 2025-08-16 SDOH — SOCIAL STABILITY: SOCIAL NETWORK: EMOTIONAL SUPPORT GIVEN: REASSURE

## 2025-08-16 SDOH — HEALTH STABILITY: MENTAL HEALTH: DELUSIONS: CONTROLLED

## 2025-08-16 SDOH — HEALTH STABILITY: MENTAL HEALTH: WISH TO BE DEAD (PAST 1 MONTH): NO

## 2025-08-16 SDOH — HEALTH STABILITY: MENTAL HEALTH: FOR HIGH RISK PATIENTS: ALL INTERVENTIONS ABOVE, PLUS:

## 2025-08-16 SDOH — SOCIAL STABILITY: SOCIAL INSECURITY: FAMILY BEHAVIORS: APPROPRIATE FOR SITUATION

## 2025-08-16 SDOH — HEALTH STABILITY: MENTAL HEALTH: HAVE YOU EVER DONE ANYTHING, STARTED TO DO ANYTHING, OR PREPARED TO DO ANYTHING TO END YOUR LIFE?: NO

## 2025-08-16 SDOH — HEALTH STABILITY: MENTAL HEALTH: HAVE YOU ACTUALLY HAD ANY THOUGHTS OF KILLING YOURSELF?: NO

## 2025-08-16 SDOH — HEALTH STABILITY: MENTAL HEALTH
OTHER SUICIDE PRECAUTIONS INCLUDE: PATIENT PLACED IN AN EASILY OBSERVABLE ROOM WITH DOOR/CURTAIN REMAINING OPEN;PATIENT PLACED IN GOWN (SNAPS OR PAPER GOWNS PREFERRED) AND WANDED;REMAINING RISKS IDENTIFIED AND MITIGATED;PROVIDER NOTIFIED;FAMILY/VISITOR ADVISED TO MAINTAIN CONTROL OF OWN PERSONAL BELONGINGS IN ROOM;HOME MEDICATION LIST COLLECTED AND SHARED WITH PROVIDER;TREATMENT PLAN BASED ON RISK FACTORS DEVELOPED (ED ONLY - IF PATIENT IN ED MORE THAN 8 HOURS);VISITORS LIMITED WHEN NECESSARY AND PERSONAL ITEMS SCREENED

## 2025-08-16 SDOH — SOCIAL STABILITY: SOCIAL NETWORK: VISITOR BEHAVIORS: APPROPRIATE FOR SITUATION

## 2025-08-16 SDOH — SOCIAL STABILITY: SOCIAL NETWORK: PARENT/GUARDIAN/SIGNIFICANT OTHER INVOLVEMENT: ATTENTIVE TO PATIENT NEEDS

## 2025-08-16 SDOH — HEALTH STABILITY: MENTAL HEALTH: HALLUCINATION: AUDITORY;TACTILE;VISUAL

## 2025-08-16 SDOH — HEALTH STABILITY: MENTAL HEALTH: HAVE YOU EVER TRIED TO KILL YOURSELF?: NO

## 2025-08-16 SDOH — HEALTH STABILITY: MENTAL HEALTH: SUICIDAL BEHAVIOR (LIFETIME): NO

## 2025-08-16 SDOH — HEALTH STABILITY: MENTAL HEALTH: BEHAVIORAL HEALTH(WDL): EXCEPTIONS TO WDL

## 2025-08-16 SDOH — HEALTH STABILITY: MENTAL HEALTH: BEHAVIORS/MOOD: COOPERATIVE;HALLUCINATIONS

## 2025-08-16 SDOH — HEALTH STABILITY: MENTAL HEALTH: HAVE YOU WISHED YOU WERE DEAD OR WISHED YOU COULD GO TO SLEEP AND NOT WAKE UP?: NO

## 2025-08-16 SDOH — HEALTH STABILITY: MENTAL HEALTH: NON-SPECIFIC ACTIVE SUICIDAL THOUGHTS (PAST 1 MONTH): NO

## 2025-08-16 SDOH — HEALTH STABILITY: MENTAL HEALTH: SUICIDE ASSESSMENT: ADULT (C-SSRS)

## 2025-08-16 SDOH — HEALTH STABILITY: MENTAL HEALTH: NEEDS EXPRESSED: DENIES

## 2025-08-16 SDOH — HEALTH STABILITY: MENTAL HEALTH: IN THE PAST FEW WEEKS, HAVE YOU WISHED YOU WERE DEAD?: NO

## 2025-08-16 SDOH — HEALTH STABILITY: MENTAL HEALTH: IN THE PAST WEEK, HAVE YOU BEEN HAVING THOUGHTS ABOUT KILLING YOURSELF?: NO

## 2025-08-16 SDOH — HEALTH STABILITY: MENTAL HEALTH: ARE YOU HAVING THOUGHTS OF KILLING YOURSELF RIGHT NOW?: NO

## 2025-08-16 SDOH — HEALTH STABILITY: MENTAL HEALTH: ANXIETY SYMPTOMS: NO PROBLEMS REPORTED OR OBSERVED.

## 2025-08-16 SDOH — HEALTH STABILITY: MENTAL HEALTH: IN THE PAST FEW WEEKS, HAVE YOU FELT THAT YOU OR YOUR FAMILY WOULD BE BETTER OFF IF YOU WERE DEAD?: NO

## 2025-08-16 SDOH — HEALTH STABILITY: MENTAL HEALTH: DEPRESSION SYMPTOMS: NO PROBLEMS REPORTED OR OBSERVED.

## 2025-08-16 SDOH — ECONOMIC STABILITY: HOUSING INSECURITY: FEELS SAFE LIVING IN HOME: YES

## 2025-08-16 ASSESSMENT — PAIN SCALES - GENERAL
PAINLEVEL_OUTOF10: 0 - NO PAIN
PAINLEVEL_OUTOF10: 0 - NO PAIN

## 2025-08-16 ASSESSMENT — LIFESTYLE VARIABLES
PRESCIPTION_ABUSE_PAST_12_MONTHS: NO
SUBSTANCE_ABUSE_PAST_12_MONTHS: NO

## 2025-08-16 ASSESSMENT — PAIN - FUNCTIONAL ASSESSMENT: PAIN_FUNCTIONAL_ASSESSMENT: 0-10

## 2025-08-17 LAB — BACTERIA UR CULT: ABNORMAL

## 2025-08-18 LAB — BACTERIA UR CULT: ABNORMAL

## 2025-08-19 ENCOUNTER — RESULTS FOLLOW-UP (OUTPATIENT)
Dept: PHARMACY | Facility: HOSPITAL | Age: 85
End: 2025-08-19
Payer: MEDICARE

## 2025-08-21 ENCOUNTER — OFFICE VISIT (OUTPATIENT)
Dept: CARDIOLOGY | Facility: HOSPITAL | Age: 85
End: 2025-08-21
Payer: MEDICARE

## 2025-08-21 VITALS
OXYGEN SATURATION: 98 % | HEART RATE: 76 BPM | WEIGHT: 120 LBS | BODY MASS INDEX: 22.08 KG/M2 | DIASTOLIC BLOOD PRESSURE: 50 MMHG | SYSTOLIC BLOOD PRESSURE: 116 MMHG | HEIGHT: 62 IN

## 2025-08-21 DIAGNOSIS — E78.2 MIXED HYPERLIPIDEMIA: Primary | ICD-10-CM

## 2025-08-21 DIAGNOSIS — I25.10 CORONARY ARTERY DISEASE INVOLVING NATIVE CORONARY ARTERY OF NATIVE HEART WITHOUT ANGINA PECTORIS: ICD-10-CM

## 2025-08-21 PROCEDURE — 99214 OFFICE O/P EST MOD 30 MIN: CPT

## 2025-08-21 PROCEDURE — 3078F DIAST BP <80 MM HG: CPT | Performed by: NURSE PRACTITIONER

## 2025-08-21 PROCEDURE — 1159F MED LIST DOCD IN RCRD: CPT | Performed by: NURSE PRACTITIONER

## 2025-08-21 PROCEDURE — 99214 OFFICE O/P EST MOD 30 MIN: CPT | Performed by: NURSE PRACTITIONER

## 2025-08-21 PROCEDURE — 3074F SYST BP LT 130 MM HG: CPT | Performed by: NURSE PRACTITIONER

## 2025-08-21 PROCEDURE — G2211 COMPLEX E/M VISIT ADD ON: HCPCS | Performed by: NURSE PRACTITIONER

## 2025-08-21 PROCEDURE — 93005 ELECTROCARDIOGRAM TRACING: CPT | Performed by: NURSE PRACTITIONER

## 2025-08-22 LAB
ATRIAL RATE: 69 BPM
ATRIAL RATE: 78 BPM
CHOLEST SERPL-MCNC: 116 MG/DL
CHOLEST/HDLC SERPL: 2.4 (CALC)
HDLC SERPL-MCNC: 48 MG/DL
LDLC SERPL CALC-MCNC: 52 MG/DL (CALC)
NONHDLC SERPL-MCNC: 68 MG/DL (CALC)
P AXIS: 61 DEGREES
P AXIS: 85 DEGREES
P OFFSET: 167 MS
P OFFSET: 186 MS
P ONSET: 135 MS
P ONSET: 136 MS
PR INTERVAL: 140 MS
PR INTERVAL: 170 MS
Q ONSET: 205 MS
Q ONSET: 221 MS
QRS COUNT: 12 BEATS
QRS COUNT: 13 BEATS
QRS DURATION: 84 MS
QRS DURATION: 88 MS
QT INTERVAL: 392 MS
QT INTERVAL: 396 MS
QTC CALCULATION(BAZETT): 424 MS
QTC CALCULATION(BAZETT): 446 MS
QTC FREDERICIA: 415 MS
QTC FREDERICIA: 427 MS
R AXIS: 74 DEGREES
R AXIS: 89 DEGREES
T AXIS: 62 DEGREES
T AXIS: 68 DEGREES
T OFFSET: 403 MS
T OFFSET: 417 MS
TRIGL SERPL-MCNC: 84 MG/DL
VENTRICULAR RATE: 69 BPM
VENTRICULAR RATE: 78 BPM

## 2025-09-03 ENCOUNTER — HOSPITAL ENCOUNTER (OUTPATIENT)
Dept: RADIOLOGY | Facility: CLINIC | Age: 85
Discharge: HOME | End: 2025-09-03
Payer: MEDICARE

## 2025-09-03 ENCOUNTER — APPOINTMENT (OUTPATIENT)
Facility: CLINIC | Age: 85
End: 2025-09-03
Payer: MEDICARE
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
## 2025-09-03 PROCEDURE — 72072 X-RAY EXAM THORAC SPINE 3VWS: CPT | Performed by: RADIOLOGY

## 2025-09-03 PROCEDURE — 72072 X-RAY EXAM THORAC SPINE 3VWS: CPT

## 2025-09-04 ENCOUNTER — TELEPHONE (OUTPATIENT)
Dept: GERIATRIC MEDICINE | Facility: CLINIC | Age: 85
End: 2025-09-04
Payer: MEDICARE

## 2025-09-26 ENCOUNTER — APPOINTMENT (OUTPATIENT)
Dept: NEUROLOGY | Facility: HOSPITAL | Age: 85
End: 2025-09-26
Payer: MEDICARE

## 2025-10-14 ENCOUNTER — APPOINTMENT (OUTPATIENT)
Dept: OBSTETRICS AND GYNECOLOGY | Facility: CLINIC | Age: 85
End: 2025-10-14
Payer: MEDICARE